# Patient Record
Sex: FEMALE | Race: WHITE | NOT HISPANIC OR LATINO | URBAN - METROPOLITAN AREA
[De-identification: names, ages, dates, MRNs, and addresses within clinical notes are randomized per-mention and may not be internally consistent; named-entity substitution may affect disease eponyms.]

---

## 2017-03-31 ENCOUNTER — APPOINTMENT (OUTPATIENT)
Dept: URBAN - METROPOLITAN AREA CLINIC 200 | Age: 57
Setting detail: DERMATOLOGY
End: 2017-04-12

## 2017-03-31 DIAGNOSIS — S0002XA BLISTER OF FACE, NECK, AND SCALP EXCEPT EYE, WITHOUT MENTION OF INFECTION: ICD-10-CM

## 2017-03-31 DIAGNOSIS — S00521A BLISTER OF FACE, NECK, AND SCALP EXCEPT EYE, WITHOUT MENTION OF INFECTION: ICD-10-CM

## 2017-03-31 DIAGNOSIS — S1092XA BLISTER OF FACE, NECK, AND SCALP EXCEPT EYE, WITHOUT MENTION OF INFECTION: ICD-10-CM

## 2017-03-31 DIAGNOSIS — L20.84 INTRINSIC (ALLERGIC) ECZEMA: ICD-10-CM

## 2017-03-31 DIAGNOSIS — S1012XA BLISTER OF FACE, NECK, AND SCALP EXCEPT EYE, WITHOUT MENTION OF INFECTION: ICD-10-CM

## 2017-03-31 DIAGNOSIS — S0032XA BLISTER OF FACE, NECK, AND SCALP EXCEPT EYE, WITHOUT MENTION OF INFECTION: ICD-10-CM

## 2017-03-31 DIAGNOSIS — S0092XA BLISTER OF FACE, NECK, AND SCALP EXCEPT EYE, WITHOUT MENTION OF INFECTION: ICD-10-CM

## 2017-03-31 DIAGNOSIS — S00522A BLISTER OF FACE, NECK, AND SCALP EXCEPT EYE, WITHOUT MENTION OF INFECTION: ICD-10-CM

## 2017-03-31 DIAGNOSIS — S00429A BLISTER OF FACE, NECK, AND SCALP EXCEPT EYE, WITHOUT MENTION OF INFECTION: ICD-10-CM

## 2017-03-31 PROBLEM — L30.9 DERMATITIS, UNSPECIFIED: Status: ACTIVE | Noted: 2017-03-31

## 2017-03-31 PROBLEM — S60.321A BLISTER (NONTHERMAL) OF RIGHT THUMB, INITIAL ENCOUNTER: Status: ACTIVE | Noted: 2017-03-31

## 2017-03-31 PROBLEM — E03.9 HYPOTHYROIDISM, UNSPECIFIED: Status: ACTIVE | Noted: 2017-03-31

## 2017-03-31 PROCEDURE — 99213 OFFICE O/P EST LOW 20 MIN: CPT

## 2017-03-31 PROCEDURE — OTHER COUNSELING: OTHER

## 2017-03-31 PROCEDURE — OTHER PRESCRIPTION: OTHER

## 2017-03-31 RX ORDER — NYSTATIN AND TRIAMCINOLONE ACETONIDE 100000; 1 [USP'U]/G; MG/G
OINTMENT TOPICAL
Qty: 1 | Refills: 8 | Status: ERX | COMMUNITY
Start: 2017-03-31

## 2017-03-31 ASSESSMENT — LOCATION ZONE DERM
LOCATION ZONE: FINGER
LOCATION ZONE: LEG

## 2017-03-31 ASSESSMENT — LOCATION SIMPLE DESCRIPTION DERM
LOCATION SIMPLE: RIGHT CALF
LOCATION SIMPLE: RIGHT PRETIBIAL REGION
LOCATION SIMPLE: RIGHT THUMB

## 2017-03-31 ASSESSMENT — LOCATION DETAILED DESCRIPTION DERM
LOCATION DETAILED: RIGHT PROXIMAL PRETIBIAL REGION
LOCATION DETAILED: RIGHT PROXIMAL CALF
LOCATION DETAILED: RIGHT PROXIMAL ULNAR THUMB

## 2017-08-23 ENCOUNTER — APPOINTMENT (OUTPATIENT)
Dept: URBAN - METROPOLITAN AREA CLINIC 200 | Age: 57
Setting detail: DERMATOLOGY
End: 2017-08-24

## 2017-08-23 DIAGNOSIS — L20.84 INTRINSIC (ALLERGIC) ECZEMA: ICD-10-CM

## 2017-08-23 PROBLEM — F32.9 MAJOR DEPRESSIVE DISORDER, SINGLE EPISODE, UNSPECIFIED: Status: ACTIVE | Noted: 2017-08-23

## 2017-08-23 PROCEDURE — OTHER COUNSELING: OTHER

## 2017-08-23 PROCEDURE — OTHER PRESCRIPTION: OTHER

## 2017-08-23 PROCEDURE — 99213 OFFICE O/P EST LOW 20 MIN: CPT

## 2017-08-23 RX ORDER — TRIAMCINOLONE ACETONIDE 1 MG/G
CREAM TOPICAL BID
Qty: 1 | Refills: 3 | Status: ERX | COMMUNITY
Start: 2017-08-23

## 2017-08-23 RX ORDER — METHYLPREDNISOLONE 4 MG/1
TABLET ORAL
Qty: 1 | Refills: 0 | Status: ERX | COMMUNITY
Start: 2017-08-23

## 2017-08-23 RX ORDER — EMOLLIENT COMBINATION NO.53
CREAM (GRAM) TOPICAL
Qty: 1 | Refills: 6 | Status: ERX | COMMUNITY
Start: 2017-08-23

## 2017-08-23 ASSESSMENT — LOCATION DETAILED DESCRIPTION DERM
LOCATION DETAILED: LEFT ANTERIOR PROXIMAL THIGH
LOCATION DETAILED: LEFT PROXIMAL DORSAL FOREARM

## 2017-08-23 ASSESSMENT — LOCATION ZONE DERM
LOCATION ZONE: LEG
LOCATION ZONE: ARM

## 2017-08-23 ASSESSMENT — LOCATION SIMPLE DESCRIPTION DERM
LOCATION SIMPLE: LEFT THIGH
LOCATION SIMPLE: LEFT FOREARM

## 2018-10-09 RX ORDER — LOSARTAN POTASSIUM 50 MG/1
TABLET ORAL
Qty: 30 TABLET | Refills: 0 | Status: SHIPPED | OUTPATIENT
Start: 2018-10-09 | End: 2018-11-09 | Stop reason: SDUPTHER

## 2018-10-16 RX ORDER — TRAZODONE HYDROCHLORIDE 150 MG/1
150 TABLET ORAL NIGHTLY
Qty: 90 TABLET | Refills: 0 | Status: SHIPPED | OUTPATIENT
Start: 2018-10-16 | End: 2019-02-27 | Stop reason: SDUPTHER

## 2018-11-01 PROBLEM — E03.9 PRIMARY HYPOTHYROIDISM: Status: ACTIVE | Noted: 2018-11-01

## 2018-11-01 PROBLEM — G47.01 SLEEP DISORDER DUE TO A GENERAL MEDICAL CONDITION, INSOMNIA TYPE: Status: ACTIVE | Noted: 2018-11-01

## 2018-11-01 PROBLEM — J30.2 SEASONAL ALLERGIC RHINITIS: Status: ACTIVE | Noted: 2018-11-01

## 2018-11-01 PROBLEM — J45.909 MILD ASTHMA: Status: ACTIVE | Noted: 2018-11-01

## 2018-11-01 PROBLEM — K63.5 COLON POLYP: Status: ACTIVE | Noted: 2018-11-01

## 2018-11-01 RX ORDER — ALPRAZOLAM 0.25 MG/1
0.25 TABLET ORAL NIGHTLY PRN
COMMUNITY
End: 2018-11-02

## 2018-11-01 RX ORDER — VENLAFAXINE HYDROCHLORIDE 150 MG/1
150 CAPSULE, EXTENDED RELEASE ORAL DAILY
COMMUNITY
End: 2019-02-27 | Stop reason: RX

## 2018-11-02 ENCOUNTER — OFFICE VISIT (OUTPATIENT)
Dept: PRIMARY CARE | Facility: CLINIC | Age: 58
End: 2018-11-02
Payer: COMMERCIAL

## 2018-11-02 VITALS
SYSTOLIC BLOOD PRESSURE: 134 MMHG | HEART RATE: 70 BPM | DIASTOLIC BLOOD PRESSURE: 80 MMHG | HEIGHT: 71 IN | BODY MASS INDEX: 21.98 KG/M2 | WEIGHT: 157 LBS

## 2018-11-02 DIAGNOSIS — Z00.00 PREVENTATIVE HEALTH CARE: ICD-10-CM

## 2018-11-02 DIAGNOSIS — Z23 NEED FOR VACCINATION: Primary | ICD-10-CM

## 2018-11-02 DIAGNOSIS — Z12.39 BREAST CANCER SCREENING: ICD-10-CM

## 2018-11-02 DIAGNOSIS — F41.1 GENERALIZED ANXIETY DISORDER: ICD-10-CM

## 2018-11-02 DIAGNOSIS — J30.1 SEASONAL ALLERGIC RHINITIS DUE TO POLLEN: ICD-10-CM

## 2018-11-02 DIAGNOSIS — I10 ESSENTIAL HYPERTENSION: ICD-10-CM

## 2018-11-02 DIAGNOSIS — R06.83 SNORING: ICD-10-CM

## 2018-11-02 PROBLEM — E03.9 PRIMARY HYPOTHYROIDISM: Status: RESOLVED | Noted: 2018-11-01 | Resolved: 2018-11-02

## 2018-11-02 PROCEDURE — 90471 IMMUNIZATION ADMIN: CPT | Performed by: NURSE PRACTITIONER

## 2018-11-02 PROCEDURE — 99214 OFFICE O/P EST MOD 30 MIN: CPT | Mod: 25 | Performed by: NURSE PRACTITIONER

## 2018-11-02 PROCEDURE — 90686 IIV4 VACC NO PRSV 0.5 ML IM: CPT | Performed by: NURSE PRACTITIONER

## 2018-11-02 RX ORDER — FLUTICASONE PROPIONATE 50 MCG
1 SPRAY, SUSPENSION (ML) NASAL DAILY
Qty: 3 BOTTLE | Refills: 3 | Status: SHIPPED | OUTPATIENT
Start: 2018-11-02 | End: 2019-01-31

## 2018-11-02 RX ORDER — FLUTICASONE PROPIONATE 50 MCG
1 SPRAY, SUSPENSION (ML) NASAL DAILY
COMMUNITY
End: 2018-11-02 | Stop reason: SDUPTHER

## 2018-11-02 RX ORDER — AZELASTINE HCL 205.5 UG/1
2 SPRAY NASAL NIGHTLY
Qty: 90 ML | Refills: 3 | Status: SHIPPED | OUTPATIENT
Start: 2018-11-02 | End: 2019-01-31

## 2018-11-02 ASSESSMENT — ENCOUNTER SYMPTOMS
CONSTITUTIONAL NEGATIVE: 1
CARDIOVASCULAR NEGATIVE: 1
RESPIRATORY NEGATIVE: 1

## 2018-11-02 NOTE — PROGRESS NOTES
[facility]  Sarah Kerr  [address], [address]  [place] [address]  Phone: [phone]  Fax: [phone]      Patient ID: Sherry Tang                              : 1960    Visit Date: 2018    Chief Complaint: Follow-up         Patient ID: Sherry Tang is a 58 y.o. female.    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, insomnia type   • Colon polyp   • Snoring   • Essential hypertension   • Generalized anxiety disorder         Current Outpatient Prescriptions:   •  fluticasone (FLONASE) 50 mcg/actuation nasal spray, Administer 1 spray into each nostril daily., Disp: 3 Bottle, Rfl: 3  •  losartan (COZAAR) 50 mg tablet, TAKE 1 TABLET BY MOUTH DAILY, Disp: 30 tablet, Rfl: 0  •  traZODone (DESYREL) 150 mg tablet, Take 1 tablet (150 mg total) by mouth nightly., Disp: 90 tablet, Rfl: 0  •  venlafaxine XR (EFFEXOR-XR) 150 mg 24 hr capsule, Take 150 mg by mouth daily., Disp: , Rfl:   •  azelastine 0.15 % (205.5 mcg) nasal spray, Administer 2 sprays into each nostril nightly., Disp: 90 mL, Rfl: 3    Allergies   Allergen Reactions   • Latex Rash       Social History     Social History   • Marital status:      Spouse name: N/A   • Number of children: N/A   • Years of education: N/A     Occupational History   • Not on file.     Social History Main Topics   • Smoking status: Never Smoker   • Smokeless tobacco: Never Used   • Alcohol use Not on file   • Drug use: Unknown   • Sexual activity: Not on file     Other Topics Concern   • Not on file     Social History Narrative   • No narrative on file       Health Maintenance   Topic Date Due   • Influenza Vaccine (1) 2018   • Breast Cancer Screening  2019   • Pap Smear  2019   • DTaP, Tdap, and Td Vaccines (2 - Td) 2022   • Colonoscopy  2025   • HPV Vaccines  Aged Out   •  "Meningococcal Vaccine  Aged Out   • HIB Vaccines  Aged Out   • IPV Vaccines  Aged Out   • Hepatitis C Screening  Completed       HPI  Med refills for allergies renewal. Doing well. Denies any acute sinus symptoms--pressure, pain, purulent mucus, earache, cough.    Needs mammogram Rx. Breast exams she dose monthly. Denies any changes, lumps, redness, nipple discharge, pain.    Snoring--wants an evaluation for sleep apnea. Very loud snoring per her significant others. Sister with sleep apnea. Not sure if her snoring is related but she would like to be evaluated. Denies chronic AM fatigue. Has not noted any pauses in her own breathing or sudden abrupt awakenings in the night.    BP check. On Losartan. Denies any CP, SOB, headache, vision change. No compliance issues.         The following have been reviewed and updated as appropriate in this visit:  Allergies  Meds  Problems         Review of System  Review of Systems   Constitutional: Negative.    Respiratory: Negative.    Cardiovascular: Negative.        Objective     Vitals  Vitals:    11/02/18 0825   BP: 134/80   Pulse: 70   Weight: 71.2 kg (157 lb)   Height: 1.803 m (5' 11\")     Body mass index is 21.9 kg/m².    Physical Exam  Physical Exam   Constitutional: She is oriented to person, place, and time. She appears well-developed and well-nourished. No distress.   HENT:   Right Ear: Tympanic membrane, external ear and ear canal normal.   Left Ear: Tympanic membrane, external ear and ear canal normal.   Nose: Nose normal.   Mouth/Throat: Oropharynx is clear and moist and mucous membranes are normal.   Neck: Neck supple. No JVD present. No thyromegaly present.   Cardiovascular: Normal rate, regular rhythm and normal heart sounds.    No murmur heard.  Pulmonary/Chest: Effort normal and breath sounds normal. No respiratory distress. She has no wheezes.   Musculoskeletal: She exhibits no edema.   Lymphadenopathy:     She has no cervical adenopathy.   Neurological: She " is alert and oriented to person, place, and time.   Skin: She is not diaphoretic.   Psychiatric: She has a normal mood and affect. Her behavior is normal. Judgment and thought content normal. She expresses no suicidal ideation. She expresses no suicidal plans.   Vitals reviewed.      Assessment/Plan     Problem List Items Addressed This Visit     Seasonal allergic rhinitis     Continue allergy nasal sprays daily.         Relevant Medications    fluticasone (FLONASE) 50 mcg/actuation nasal spray    azelastine 0.15 % (205.5 mcg) nasal spray    Snoring     Sent to [facility] for sleep evaluation.         Relevant Orders    Ambulatory referral to Sleep Medicine    Essential hypertension     Stable on Losartan.  Routine labs ordered.  Routine follow up 6 mo.         Generalized anxiety disorder     Continue Venlafaxine--stable           Other Visit Diagnoses     Need for vaccination    -  Primary    Relevant Orders    Influenza vaccine quadrivalent preservative free 6 mon and older IM (FluLaval) (Completed)    Breast cancer screening        Relevant Orders    BI SCREENING MAMMOGRAM BILATERAL    Preventative health care        Relevant Orders    CBC and Differential    Comprehensive metabolic panel    Lipid panel    TSH 3rd Generation    Urinalysis with Reflex Culture              SHAAN Perez  11/2/2018

## 2018-11-09 RX ORDER — LOSARTAN POTASSIUM 50 MG/1
TABLET ORAL
Qty: 30 TABLET | Refills: 0 | Status: SHIPPED | OUTPATIENT
Start: 2018-11-09 | End: 2018-12-14 | Stop reason: SDUPTHER

## 2018-12-14 RX ORDER — LOSARTAN POTASSIUM 50 MG/1
TABLET ORAL
Qty: 30 TABLET | Refills: 0 | Status: SHIPPED | OUTPATIENT
Start: 2018-12-14 | End: 2019-08-06

## 2019-02-27 RX ORDER — TRAZODONE HYDROCHLORIDE 150 MG/1
150 TABLET ORAL NIGHTLY
Qty: 90 TABLET | Refills: 2 | Status: SHIPPED | OUTPATIENT
Start: 2019-02-27 | End: 2019-08-06 | Stop reason: SDUPTHER

## 2019-02-27 RX ORDER — VENLAFAXINE HYDROCHLORIDE 150 MG/1
150 CAPSULE, EXTENDED RELEASE ORAL DAILY
Qty: 90 CAPSULE | Refills: 2 | Status: SHIPPED | OUTPATIENT
Start: 2019-02-27 | End: 2019-08-06 | Stop reason: SDUPTHER

## 2019-02-27 NOTE — TELEPHONE ENCOUNTER
Patient called from [place] requesting refills to be sent to local [facility] and then will have that prescription transferred.  I called patient back at  and verified strength, dosing and quantity.  Qued up for refill.

## 2019-03-12 ENCOUNTER — TELEPHONE (OUTPATIENT)
Dept: PRIMARY CARE | Facility: CLINIC | Age: 59
End: 2019-03-12

## 2019-03-12 NOTE — TELEPHONE ENCOUNTER
Patient staying for extended period of time in [place].  Would like last set of labs.  No way to deliver labs as there is no fax and she does not have a portal.  Advised the best way to get access to labs is to create a lab abel portal which will allow her full access to all her labs. Patient agrees.

## 2019-03-21 PROBLEM — F41.1 GENERALIZED ANXIETY DISORDER: Status: ACTIVE | Noted: 2019-03-21

## 2019-03-21 PROBLEM — M25.50 JOINT PAIN: Status: ACTIVE | Noted: 2019-03-21

## 2019-03-21 PROBLEM — E03.9 PRIMARY HYPOTHYROIDISM: Status: ACTIVE | Noted: 2019-03-21

## 2019-03-21 PROBLEM — J45.909 MILD ASTHMA: Status: ACTIVE | Noted: 2019-03-21

## 2019-03-21 PROBLEM — J30.9 ATOPIC RHINITIS: Status: ACTIVE | Noted: 2019-03-21

## 2019-03-21 PROBLEM — K63.5 COLON POLYP: Status: ACTIVE | Noted: 2019-03-21

## 2019-03-21 PROBLEM — G47.01 SLEEP DISORDER DUE TO A GENERAL MEDICAL CONDITION, INSOMNIA TYPE: Status: ACTIVE | Noted: 2019-03-21

## 2019-05-01 DIAGNOSIS — Z12.39 BREAST CANCER SCREENING: ICD-10-CM

## 2019-07-16 NOTE — TELEPHONE ENCOUNTER
Medicine Refill Request    Last Office Visit: 11/2/2018  Next Office Visit: Visit date not found        Current Outpatient Prescriptions:   •  losartan (COZAAR) 50 mg tablet, TAKE 1 TABLET BY MOUTH DAILY, Disp: 30 tablet, Rfl: 0  •  traZODone (DESYREL) 150 mg tablet, Take 1 tablet (150 mg total) by mouth nightly., Disp: 90 tablet, Rfl: 2  •  venlafaxine XR (EFFEXOR-XR) 150 mg 24 hr capsule, Take 1 capsule (150 mg total) by mouth daily., Disp: 90 capsule, Rfl: 2      BP Readings from Last 3 Encounters:   11/02/18 134/80       Recent Lab results:  No results found for: CHOL, No results found for: HDL, No results found for: LDLCALC, No results found for: TRIG     No results found for: GLUCOSE, No results found for: HGBA1C      No results found for: CREATININE    No results found for: TSH

## 2019-07-16 NOTE — TELEPHONE ENCOUNTER
Last OV was 11/2018-needs appt and to get TSH done (labs were ordered at last visit-not done yet)-thank you

## 2019-07-18 ENCOUNTER — APPOINTMENT (OUTPATIENT)
Dept: URBAN - METROPOLITAN AREA CLINIC 200 | Age: 59
Setting detail: DERMATOLOGY
End: 2019-07-24

## 2019-07-18 DIAGNOSIS — L21.8 OTHER SEBORRHEIC DERMATITIS: ICD-10-CM

## 2019-07-18 DIAGNOSIS — L57.8 OTHER SKIN CHANGES DUE TO CHRONIC EXPOSURE TO NONIONIZING RADIATION: ICD-10-CM

## 2019-07-18 DIAGNOSIS — L20.84 INTRINSIC (ALLERGIC) ECZEMA: ICD-10-CM

## 2019-07-18 PROBLEM — F32.9 MAJOR DEPRESSIVE DISORDER, SINGLE EPISODE, UNSPECIFIED: Status: ACTIVE | Noted: 2019-07-18

## 2019-07-18 PROCEDURE — 99212 OFFICE O/P EST SF 10 MIN: CPT

## 2019-07-18 PROCEDURE — OTHER MIPS QUALITY: OTHER

## 2019-07-18 PROCEDURE — OTHER PRESCRIPTION: OTHER

## 2019-07-18 PROCEDURE — OTHER COUNSELING: OTHER

## 2019-07-18 RX ORDER — BETAMETHASONE DIPROPIONATE 0.5 MG/ML
LOTION, AUGMENTED TOPICAL
Qty: 1 | Refills: 3 | Status: ERX | COMMUNITY
Start: 2019-07-18

## 2019-07-18 RX ORDER — BETAMETHASONE DIPROPIONATE 0.5 MG/G
CREAM TOPICAL BID
Qty: 1 | Refills: 5 | Status: ERX | COMMUNITY
Start: 2019-07-18

## 2019-07-18 ASSESSMENT — SEVERITY ASSESSMENT: SEVERITY: MILD TO MODERATE

## 2019-07-18 ASSESSMENT — LOCATION DETAILED DESCRIPTION DERM
LOCATION DETAILED: LEFT MEDIAL SUPERIOR CHEST
LOCATION DETAILED: HAIR
LOCATION DETAILED: LEFT ANTERIOR DISTAL THIGH

## 2019-07-18 ASSESSMENT — LOCATION ZONE DERM
LOCATION ZONE: TRUNK
LOCATION ZONE: SCALP
LOCATION ZONE: LEG

## 2019-07-18 ASSESSMENT — LOCATION SIMPLE DESCRIPTION DERM
LOCATION SIMPLE: HAIR
LOCATION SIMPLE: LEFT THIGH
LOCATION SIMPLE: CHEST

## 2019-08-05 ENCOUNTER — TELEPHONE (OUTPATIENT)
Dept: PRIMARY CARE | Facility: CLINIC | Age: 59
End: 2019-08-05

## 2019-08-05 DIAGNOSIS — Z00.00 PREVENTATIVE HEALTH CARE: Primary | ICD-10-CM

## 2019-08-06 ENCOUNTER — OFFICE VISIT (OUTPATIENT)
Dept: PRIMARY CARE | Facility: CLINIC | Age: 59
End: 2019-08-06

## 2019-08-06 VITALS
DIASTOLIC BLOOD PRESSURE: 80 MMHG | HEIGHT: 71 IN | BODY MASS INDEX: 20.58 KG/M2 | WEIGHT: 147 LBS | HEART RATE: 70 BPM | SYSTOLIC BLOOD PRESSURE: 122 MMHG

## 2019-08-06 DIAGNOSIS — N94.10 DYSPAREUNIA IN FEMALE: ICD-10-CM

## 2019-08-06 DIAGNOSIS — R59.9 LYMPH NODES ENLARGED: ICD-10-CM

## 2019-08-06 DIAGNOSIS — E03.9 PRIMARY HYPOTHYROIDISM: ICD-10-CM

## 2019-08-06 DIAGNOSIS — F41.1 GENERALIZED ANXIETY DISORDER: Primary | ICD-10-CM

## 2019-08-06 DIAGNOSIS — G47.01 SLEEP DISORDER DUE TO A GENERAL MEDICAL CONDITION, INSOMNIA TYPE: ICD-10-CM

## 2019-08-06 PROCEDURE — 99213 OFFICE O/P EST LOW 20 MIN: CPT | Performed by: INTERNAL MEDICINE

## 2019-08-06 RX ORDER — TRAZODONE HYDROCHLORIDE 150 MG/1
150 TABLET ORAL NIGHTLY
Qty: 90 TABLET | Refills: 1 | Status: SHIPPED | OUTPATIENT
Start: 2019-08-06 | End: 2019-08-08 | Stop reason: SDUPTHER

## 2019-08-06 RX ORDER — LEVOTHYROXINE SODIUM 150 UG/1
150 TABLET ORAL
Qty: 90 TABLET | Refills: 3 | Status: SHIPPED | OUTPATIENT
Start: 2019-08-06 | End: 2019-08-08 | Stop reason: SDUPTHER

## 2019-08-06 RX ORDER — VENLAFAXINE HYDROCHLORIDE 150 MG/1
150 CAPSULE, EXTENDED RELEASE ORAL DAILY
Qty: 90 CAPSULE | Refills: 1 | Status: SHIPPED | OUTPATIENT
Start: 2019-08-06 | End: 2019-08-08 | Stop reason: SDUPTHER

## 2019-08-06 RX ORDER — LEVOTHYROXINE SODIUM 150 UG/1
TABLET ORAL
COMMUNITY
End: 2019-08-06 | Stop reason: SDUPTHER

## 2019-08-06 RX ORDER — AZITHROMYCIN 250 MG/1
TABLET, FILM COATED ORAL
Qty: 6 TABLET | Refills: 0 | Status: SHIPPED | OUTPATIENT
Start: 2019-08-06 | End: 2019-08-11

## 2019-08-06 ASSESSMENT — ENCOUNTER SYMPTOMS
TROUBLE SWALLOWING: 0
FATIGUE: 0
FEVER: 0
PALPITATIONS: 0
SORE THROAT: 0
WHEEZING: 0
SHORTNESS OF BREATH: 0
COUGH: 0
ACTIVITY CHANGE: 0

## 2019-08-06 NOTE — PROGRESS NOTES
[facility]  Dr. Chanelle Grier  [address], [address]  [place] [address]  Phone: [phone]  Fax: [phone]      Patient ID: Sherry Tang                              : 1960    Visit Date: 2019    Chief Complaint: Dyspareunia (since about April had intercourse twice both times painful. Use coconut oil as a [place]) and Hypothyroidism (needs meds refilled )      HPI  Going for labs later today.  Lives in [facility] as she could not find employment in the . She was back for her 40th HS reunion in [place], [place] and admits she ate and drank too much. Still will do the labs she should have had prior to the visit. Leaving to return to  .  Here for medication refills.  Tried with a doctor in the [place] to taper the anxiety and sleep meds but could not.  No longer needs the BP medication.  She also noticed an enlarged LN at the right chin that is smaller but still apparent. She denies sinusitis but has slight discomfort at the right neck and throat. No fever.    Pt c/o painful intercourse.         Subjective      Patient ID: Sherry Tang is a 58 y.o. female.    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, insomnia type   • Joint pain   • Colon polyp   • Lymph nodes enlarged         Current Outpatient Prescriptions:   •  traZODone (DESYREL) 150 mg tablet, Take 1 tablet (150 mg total) by mouth nightly., Disp: 90 tablet, Rfl: 1  •  venlafaxine XR (EFFEXOR-XR) 150 mg 24 hr capsule, Take 1 capsule (150 mg total) by mouth daily., Disp: 90 capsule, Rfl: 1  •  azithromycin (ZITHROMAX) 250 mg  "tablet, Take 2 tablets the first day, then 1 tablet daily for 4 days., Disp: 6 tablet, Rfl: 0  •  levothyroxine (SYNTHROID) 150 mcg tablet, Take 1 tablet (150 mcg total) by mouth daily., Disp: 90 tablet, Rfl: 3    Allergies   Allergen Reactions   • Latex Rash       Social History     Social History   • Marital status: Single     Spouse name: N/A   • Number of children: N/A   • Years of education: N/A     Occupational History   • Not on file.     Social History Main Topics   • Smoking status: Never Smoker   • Smokeless tobacco: Never Used   • Alcohol use Not on file   • Drug use: Unknown   • Sexual activity: Not on file     Other Topics Concern   • Not on file     Social History Narrative    ** Merged History Encounter **            Health Maintenance   Topic Date Due   • HIV Screening  09/19/1973   • Cervical Cancer Screening  09/19/1981   • Zoster Vaccine (1 of 2) 09/19/2010   • Influenza Vaccine (1) 08/01/2019   • Mammogram  11/16/2020   • Colonoscopy  11/12/2025   • DTaP, Tdap, and Td Vaccines (3 - Td) 05/01/2027   • Meningococcal ACWY  Aged Out   • Varicella Vaccines  Aged Out   • HIB Vaccines  Aged Out   • IPV Vaccines  Aged Out   • HPV Vaccines  Aged Out   • Hepatitis C Screening  Completed   • Pneumococcal  Aged Out       No past medical history on file.    The following have been reviewed and updated as appropriate in this visit:  Allergies  Meds  Problems         Review of System  Review of Systems   Constitutional: Negative for activity change, fatigue and fever.   HENT: Negative for congestion, ear pain, sore throat and trouble swallowing.    Respiratory: Negative for cough, shortness of breath and wheezing.    Cardiovascular: Negative for chest pain and palpitations.       Objective     Vitals  Vitals:    08/06/19 1018   BP: 122/80   BP Location: Right upper arm   Patient Position: Sitting   Pulse: 70   Weight: 66.7 kg (147 lb)   Height: 1.803 m (5' 11\")       Body mass index is 20.5 " kg/m².    Physical Exam  Physical Exam   Constitutional: She is oriented to person, place, and time. She appears well-nourished.   HENT:   Head: Normocephalic.   Nose: Nose normal.   Mouth/Throat: Oropharynx is clear and moist.   Eyes: Pupils are equal, round, and reactive to light. Conjunctivae and EOM are normal.   Neck: Normal range of motion. Neck supple. No thyromegaly present.   Cardiovascular: Normal rate, regular rhythm and normal heart sounds.  Exam reveals no gallop.    No murmur heard.  Pulmonary/Chest: Effort normal and breath sounds normal.   Lymphadenopathy:     She has cervical adenopathy (1.5 cm NT LN at right submandibular region).   Neurological: She is alert and oriented to person, place, and time.   Skin: Skin is warm and dry.   Psychiatric: She has a normal mood and affect. Thought content normal.   Vitals reviewed.      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 Aware she may need to f/u with a physician in [place] if it persists.         Relevant Medications    azithromycin (ZITHROMAX) 250 mg tablet      Other Visit Diagnoses     Dyspareunia in female        Advised the pt no time was allotted at this visit to address this issue. Referred to gyn or pt suggested [facility] given her time constraints.       Offered to see if pt could be seen today with [facility] gynecologist but pt declined per Magali at check out.  Made her aware she may need to address her new concerns back in      There are no Patient Instructions on file for this visit.    Return in about 6 months (around 2/6/2020), or if symptoms worsen or fail to improve.      Chanelle Grier MD  8/6/2019

## 2019-08-06 NOTE — PROGRESS NOTES
[facility]  Dr. Chanelle Grier  [address], [address]  [place] [address]  Phone: [phone]  Fax: [phone]      Patient ID: Sherry Tang                              : 1960    Visit Date: 2019    Chief Complaint: Dyspareunia (since about April had intercourse twice both times painful. Use coconut oil as a [place]) and Hypothyroidism (needs meds refilled )      HPI  Has international health care coverage.  [place]ives in the [place]  Going for residency.  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           Subjective      Patient ID: Sherry Tang is a 58 y.o. female.    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, insomnia type   • Joint pain   • Colon polyp   • Lymph nodes enlarged         Current Outpatient Prescriptions:   •  traZODone (DESYREL) 150 mg tablet, Take 1 tablet (150 mg total) by mouth nightly., Disp: 90 tablet, Rfl: 1  •  venlafaxine XR (EFFEXOR-XR) 150 mg 24 hr capsule, Take 1 capsule (150 mg total) by mouth daily., Disp: 90 capsule, Rfl: 1  •  azithromycin (ZITHROMAX) 250 mg tablet, Take 2 tablets the first day, then 1 tablet daily for 4 days., Disp: 6 tablet, Rfl: 0  •  levothyroxine (SYNTHROID) 150 mcg tablet, Take 1 tablet (150 mcg total) by mouth daily., Disp: 90 tablet, Rfl: 3    Allergies   Allergen Reactions   • Latex Rash       Social History     Social History   • Marital status: Single     Spouse name: N/A   • Number of children: N/A   • Years of education: N/A     Occupational History   • Not on file.     Social History Main Topics   • Smoking status: Never Smoker   •  "Smokeless tobacco: Never Used   • Alcohol use Not on file   • Drug use: Unknown   • Sexual activity: Not on file     Other Topics Concern   • Not on file     Social History Narrative    ** Merged History Encounter **            Health Maintenance   Topic Date Due   • HIV Screening  09/19/1973   • Cervical Cancer Screening  09/19/1981   • Zoster Vaccine (1 of 2) 09/19/2010   • Influenza Vaccine (1) 08/01/2019   • Mammogram  11/16/2020   • Colonoscopy  11/12/2025   • DTaP, Tdap, and Td Vaccines (3 - Td) 05/01/2027   • Meningococcal ACWY  Aged Out   • Varicella Vaccines  Aged Out   • HIB Vaccines  Aged Out   • IPV Vaccines  Aged Out   • HPV Vaccines  Aged Out   • Hepatitis C Screening  Completed   • Pneumococcal  Aged Out       No past medical history on file.    The following have been reviewed and updated as appropriate in this visit:         Review of System  Review of Systems    Objective     Vitals  Vitals:    08/06/19 1018   BP: 122/80   BP Location: Right upper arm   Patient Position: Sitting   Pulse: 70   Weight: 66.7 kg (147 lb)   Height: 1.803 m (5' 11\")       Body mass index is 20.5 kg/m².    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    No Follow-up on file.      Chanelle Grier MD  8/6/2019      "

## 2019-08-06 NOTE — ASSESSMENT & PLAN NOTE
If the LN remains in the next week or so, she will use the Z-elayne, which she has taken in the past. Aware she may need to f/u with a physician in [place] if it persists.

## 2019-08-07 LAB
ALBUMIN SERPL-MCNC: 4.5 G/DL (ref 3.5–5.5)
ALBUMIN/GLOB SERPL: 1.7 {RATIO} (ref 1.2–2.2)
ALP SERPL-CCNC: 64 IU/L (ref 39–117)
ALT SERPL-CCNC: 14 IU/L (ref 0–32)
APPEARANCE UR: CLEAR
AST SERPL-CCNC: 21 IU/L (ref 0–40)
BACTERIA #/AREA URNS HPF: NORMAL /[HPF]
BASOPHILS # BLD AUTO: 0 X10E3/UL (ref 0–0.2)
BASOPHILS NFR BLD AUTO: 0 %
BILIRUB SERPL-MCNC: 0.5 MG/DL (ref 0–1.2)
BILIRUB UR QL STRIP: NEGATIVE
BUN SERPL-MCNC: 12 MG/DL (ref 6–24)
BUN/CREAT SERPL: 13 (ref 9–23)
CALCIUM SERPL-MCNC: 9.4 MG/DL (ref 8.7–10.2)
CHLORIDE SERPL-SCNC: 100 MMOL/L (ref 96–106)
CHOLEST SERPL-MCNC: 218 MG/DL (ref 100–199)
CO2 SERPL-SCNC: 25 MMOL/L (ref 20–29)
COLOR UR: YELLOW
CREAT SERPL-MCNC: 0.93 MG/DL (ref 0.57–1)
EOSINOPHIL # BLD AUTO: 0.2 X10E3/UL (ref 0–0.4)
EOSINOPHIL NFR BLD AUTO: 3 %
EPI CELLS #/AREA URNS HPF: NORMAL /HPF
ERYTHROCYTE [DISTWIDTH] IN BLOOD BY AUTOMATED COUNT: 12.5 % (ref 12.3–15.4)
GLOBULIN SER CALC-MCNC: 2.6 G/DL (ref 1.5–4.5)
GLUCOSE SERPL-MCNC: 91 MG/DL (ref 65–99)
GLUCOSE UR QL: NEGATIVE
HCT VFR BLD AUTO: 39.2 % (ref 34–46.6)
HDLC SERPL-MCNC: 89 MG/DL
HGB BLD-MCNC: 13.6 G/DL (ref 11.1–15.9)
HGB UR QL STRIP: NEGATIVE
IMM GRANULOCYTES # BLD AUTO: 0 X10E3/UL (ref 0–0.1)
IMM GRANULOCYTES NFR BLD AUTO: 0 %
KETONES UR QL STRIP: NEGATIVE
LAB CORP EGFR IF AFRICN AM: 78 ML/MIN/1.73
LAB CORP EGFR IF NONAFRICN AM: 68 ML/MIN/1.73
LAB CORP URINALYSIS REFLEX: (no result)
LDLC SERPL CALC-MCNC: 108 MG/DL (ref 0–99)
LEUKOCYTE ESTERASE UR QL STRIP: NEGATIVE
LYMPHOCYTES # BLD AUTO: 2.1 X10E3/UL (ref 0.7–3.1)
LYMPHOCYTES NFR BLD AUTO: 31 %
MCH RBC QN AUTO: 29.9 PG (ref 26.6–33)
MCHC RBC AUTO-ENTMCNC: 34.7 G/DL (ref 31.5–35.7)
MCV RBC AUTO: 86 FL (ref 79–97)
MICRO URNS: (no result)
MICRO URNS: (no result)
MONOCYTES # BLD AUTO: 0.6 X10E3/UL (ref 0.1–0.9)
MONOCYTES NFR BLD AUTO: 8 %
NEUTROPHILS # BLD AUTO: 3.9 X10E3/UL (ref 1.4–7)
NEUTROPHILS NFR BLD AUTO: 58 %
NITRITE UR QL STRIP: NEGATIVE
PH UR STRIP: 5.5 [PH] (ref 5–7.5)
PLATELET # BLD AUTO: 240 X10E3/UL (ref 150–450)
POTASSIUM SERPL-SCNC: 4.9 MMOL/L (ref 3.5–5.2)
PROT SERPL-MCNC: 7.1 G/DL (ref 6–8.5)
PROT UR QL STRIP: NEGATIVE
RBC # BLD AUTO: 4.55 X10E6/UL (ref 3.77–5.28)
RBC #/AREA URNS HPF: NORMAL /HPF
SODIUM SERPL-SCNC: 139 MMOL/L (ref 134–144)
SP GR UR: 1.01 (ref 1–1.03)
TRIGL SERPL-MCNC: 107 MG/DL (ref 0–149)
TSH SERPL DL<=0.005 MIU/L-ACNC: 0.34 UIU/ML (ref 0.45–4.5)
UROBILINOGEN UR STRIP-MCNC: 0.2 MG/DL (ref 0.2–1)
VLDLC SERPL CALC-MCNC: 21 MG/DL (ref 5–40)
WBC # BLD AUTO: 6.8 X10E3/UL (ref 3.4–10.8)
WBC #/AREA URNS HPF: NORMAL /HPF

## 2019-08-08 ENCOUNTER — TELEPHONE (OUTPATIENT)
Dept: PRIMARY CARE | Facility: CLINIC | Age: 59
End: 2019-08-08

## 2019-08-08 DIAGNOSIS — F41.1 GENERALIZED ANXIETY DISORDER: ICD-10-CM

## 2019-08-08 DIAGNOSIS — G47.01 SLEEP DISORDER DUE TO A GENERAL MEDICAL CONDITION, INSOMNIA TYPE: ICD-10-CM

## 2019-08-08 DIAGNOSIS — E03.9 PRIMARY HYPOTHYROIDISM: ICD-10-CM

## 2019-08-08 RX ORDER — LEVOTHYROXINE SODIUM 150 UG/1
150 TABLET ORAL
Qty: 90 TABLET | Refills: 3 | Status: SHIPPED | OUTPATIENT
Start: 2019-08-08 | End: 2021-10-28 | Stop reason: SDUPTHER

## 2019-08-08 RX ORDER — VENLAFAXINE HYDROCHLORIDE 150 MG/1
150 CAPSULE, EXTENDED RELEASE ORAL DAILY
Qty: 90 CAPSULE | Refills: 1 | Status: SHIPPED | OUTPATIENT
Start: 2019-08-08 | End: 2021-10-28 | Stop reason: SDUPTHER

## 2019-08-08 RX ORDER — TRAZODONE HYDROCHLORIDE 150 MG/1
150 TABLET ORAL NIGHTLY
Qty: 90 TABLET | Refills: 1 | Status: SHIPPED | OUTPATIENT
Start: 2019-08-08 | End: 2021-10-28 | Stop reason: SDUPTHER

## 2019-08-08 NOTE — TELEPHONE ENCOUNTER
She is asking for a 30 day supply of azithromycin. I see you have her a normal zpak but did you want this changed. Also she would like theses all printed

## 2019-08-08 NOTE — TELEPHONE ENCOUNTER
Patient is requesting physical scripts for all four meds so she can take it to the pharma. The pharma only gave her one month supply and told her to bring in the scripts. I can give her a call when it is ready to be picked up.     Thanks!                              Medicine Refill Request    Last Office Visit: 8/6/2019  Next Office Visit: Visit date not found        Current Outpatient Prescriptions:   •  azithromycin (ZITHROMAX) 250 mg tablet, Take 2 tablets the first day, then 1 tablet daily for 4 days., Disp: 6 tablet, Rfl: 0  •  levothyroxine (SYNTHROID) 150 mcg tablet, Take 1 tablet (150 mcg total) by mouth daily., Disp: 90 tablet, Rfl: 3  •  traZODone (DESYREL) 150 mg tablet, Take 1 tablet (150 mg total) by mouth nightly., Disp: 90 tablet, Rfl: 1  •  venlafaxine XR (EFFEXOR-XR) 150 mg 24 hr capsule, Take 1 capsule (150 mg total) by mouth daily., Disp: 90 capsule, Rfl: 1      BP Readings from Last 3 Encounters:   08/06/19 122/80   11/02/18 134/80       Recent Lab results:  Lab Results   Component Value Date    CHOL 218 (H) 08/06/2019   ,   Lab Results   Component Value Date    HDL 89 08/06/2019   ,   Lab Results   Component Value Date    LDLCALC 108 (H) 08/06/2019   ,   Lab Results   Component Value Date    TRIG 107 08/06/2019        Lab Results   Component Value Date    GLUCOSE 91 08/06/2019   , No results found for: HGBA1C      Lab Results   Component Value Date    CREATININE 0.93 08/06/2019       Lab Results   Component Value Date    TSH 0.338 (L) 08/06/2019

## 2019-08-08 NOTE — TELEPHONE ENCOUNTER
The pt's labs are normal except it appears the pt could be on a little too much levothyroxine. I recommend she stay on her current dose, but pick one day of the week, say Monday, and take just 1/2 tab. Thx

## 2019-08-08 NOTE — TELEPHONE ENCOUNTER
Staff, I do not know this pt's detailed history. Why does she want #30 days of azithromycin. If the reason is not clear, please leave this renewal for Sarah. Rosmery

## 2019-08-09 ENCOUNTER — TELEPHONE (OUTPATIENT)
Dept: PRIMARY CARE | Facility: CLINIC | Age: 59
End: 2019-08-09

## 2019-08-09 RX ORDER — LEVOTHYROXINE SODIUM 150 UG/1
TABLET ORAL
Qty: 30 TABLET | Refills: 0 | OUTPATIENT
Start: 2019-08-09

## 2020-04-22 ENCOUNTER — TELEPHONE (OUTPATIENT)
Dept: FAMILY MEDICINE | Facility: CLINIC | Age: 60
End: 2020-04-22

## 2020-04-22 NOTE — TELEPHONE ENCOUNTER
Called pt to set up a telemed appt.  Pt states she is actually stuck in the [place] and has been there for a while.  She will contact us once [place] the [place] and gets insurance again.

## 2020-11-05 ENCOUNTER — TELEPHONE (OUTPATIENT)
Dept: PRIMARY CARE | Facility: CLINIC | Age: 60
End: 2020-11-05

## 2020-11-05 NOTE — TELEPHONE ENCOUNTER
Pt's needs to have an appointment for medication refills. Please call pt to verify if she has new insurance and is able to come in for a visit.

## 2021-04-09 ENCOUNTER — TELEPHONE (OUTPATIENT)
Dept: PRIMARY CARE | Facility: CLINIC | Age: 61
End: 2021-04-09

## 2021-04-09 NOTE — TELEPHONE ENCOUNTER
She is in [facility] [place] and does not know when she is returning. Suggested she see someone there since she has not been seen here since 8/2019 and we can not do a telemed. She states depression meds are not available there. She would have a provider here have them send to pharmacy here and a friend of hers will ship them to her. Please advise

## 2021-04-09 NOTE — TELEPHONE ENCOUNTER
Medicine Refill Request    Last Office Visit: 8/6/2019  Last Telemedicine Visit: Visit date not found    Next Office Visit: Visit date not found  Next Telemedicine Visit: Visit date not found     Patient needs an appointment. Received a fax for refills on Venlafaxine and Trazodone.     Current Outpatient Medications:   •  levothyroxine (SYNTHROID) 150 mcg tablet, Take 1 tablet (150 mcg total) by mouth daily., Disp: 90 tablet, Rfl: 3  •  traZODone (DESYREL) 150 mg tablet, Take 1 tablet (150 mg total) by mouth nightly., Disp: 90 tablet, Rfl: 1  •  venlafaxine XR (EFFEXOR-XR) 150 mg 24 hr capsule, Take 1 capsule (150 mg total) by mouth daily., Disp: 90 capsule, Rfl: 1      BP Readings from Last 3 Encounters:   08/06/19 122/80   11/02/18 134/80       Recent Lab results:  Lab Results   Component Value Date    CHOL 218 (H) 08/06/2019   ,   Lab Results   Component Value Date    HDL 89 08/06/2019   ,   Lab Results   Component Value Date    LDLCALC 108 (H) 08/06/2019   ,   Lab Results   Component Value Date    TRIG 107 08/06/2019        Lab Results   Component Value Date    GLUCOSE 91 08/06/2019   , No results found for: HGBA1C      Lab Results   Component Value Date    CREATININE 0.93 08/06/2019       Lab Results   Component Value Date    TSH 0.338 (L) 08/06/2019

## 2021-06-11 ENCOUNTER — APPOINTMENT (OUTPATIENT)
Dept: URBAN - METROPOLITAN AREA CLINIC 200 | Age: 61
Setting detail: DERMATOLOGY
End: 2021-06-11

## 2021-06-11 DIAGNOSIS — L57.8 OTHER SKIN CHANGES DUE TO CHRONIC EXPOSURE TO NONIONIZING RADIATION: ICD-10-CM

## 2021-07-23 ENCOUNTER — TELEPHONE (OUTPATIENT)
Dept: PRIMARY CARE | Facility: CLINIC | Age: 61
End: 2021-07-23

## 2021-10-28 ENCOUNTER — OFFICE VISIT (OUTPATIENT)
Dept: PRIMARY CARE | Facility: CLINIC | Age: 61
End: 2021-10-28
Payer: COMMERCIAL

## 2021-10-28 VITALS
DIASTOLIC BLOOD PRESSURE: 80 MMHG | WEIGHT: 153 LBS | OXYGEN SATURATION: 98 % | SYSTOLIC BLOOD PRESSURE: 120 MMHG | BODY MASS INDEX: 21.34 KG/M2 | HEART RATE: 67 BPM

## 2021-10-28 DIAGNOSIS — F41.1 GENERALIZED ANXIETY DISORDER: ICD-10-CM

## 2021-10-28 DIAGNOSIS — G47.01 SLEEP DISORDER DUE TO A GENERAL MEDICAL CONDITION, INSOMNIA TYPE: ICD-10-CM

## 2021-10-28 DIAGNOSIS — E03.9 PRIMARY HYPOTHYROIDISM: ICD-10-CM

## 2021-10-28 DIAGNOSIS — Z23 NEED FOR VACCINATION: ICD-10-CM

## 2021-10-28 DIAGNOSIS — Z12.31 BREAST CANCER SCREENING BY MAMMOGRAM: ICD-10-CM

## 2021-10-28 DIAGNOSIS — Z00.00 ROUTINE PHYSICAL EXAMINATION: Primary | ICD-10-CM

## 2021-10-28 DIAGNOSIS — J45.30 MILD PERSISTENT ASTHMA WITHOUT COMPLICATION: ICD-10-CM

## 2021-10-28 DIAGNOSIS — J45.20 MILD INTERMITTENT ASTHMA WITHOUT COMPLICATION: ICD-10-CM

## 2021-10-28 DIAGNOSIS — B35.1 ONYCHOMYCOSIS: ICD-10-CM

## 2021-10-28 PROBLEM — R06.83 SNORING: Status: RESOLVED | Noted: 2018-11-02 | Resolved: 2021-10-28

## 2021-10-28 PROBLEM — M25.50 JOINT PAIN: Status: RESOLVED | Noted: 2019-03-21 | Resolved: 2021-10-28

## 2021-10-28 PROBLEM — R59.9 LYMPH NODES ENLARGED: Status: RESOLVED | Noted: 2019-08-06 | Resolved: 2021-10-28

## 2021-10-28 PROCEDURE — 3008F BODY MASS INDEX DOCD: CPT | Performed by: NURSE PRACTITIONER

## 2021-10-28 PROCEDURE — 99213 OFFICE O/P EST LOW 20 MIN: CPT | Mod: 25 | Performed by: NURSE PRACTITIONER

## 2021-10-28 PROCEDURE — 90471 IMMUNIZATION ADMIN: CPT | Performed by: NURSE PRACTITIONER

## 2021-10-28 PROCEDURE — 3074F SYST BP LT 130 MM HG: CPT | Performed by: NURSE PRACTITIONER

## 2021-10-28 PROCEDURE — 99396 PREV VISIT EST AGE 40-64: CPT | Mod: 25 | Performed by: NURSE PRACTITIONER

## 2021-10-28 PROCEDURE — 3079F DIAST BP 80-89 MM HG: CPT | Performed by: NURSE PRACTITIONER

## 2021-10-28 PROCEDURE — 90686 IIV4 VACC NO PRSV 0.5 ML IM: CPT | Performed by: NURSE PRACTITIONER

## 2021-10-28 RX ORDER — CICLOPIROX 80 MG/ML
SOLUTION TOPICAL NIGHTLY
Qty: 6.6 ML | Refills: 0 | Status: SHIPPED | OUTPATIENT
Start: 2021-10-28 | End: 2022-01-26

## 2021-10-28 RX ORDER — LEVOTHYROXINE SODIUM 150 UG/1
150 TABLET ORAL
Qty: 90 TABLET | Refills: 3 | Status: SHIPPED | OUTPATIENT
Start: 2021-10-28 | End: 2021-11-04 | Stop reason: SDUPTHER

## 2021-10-28 RX ORDER — ALPRAZOLAM 0.25 MG/1
0.25 TABLET ORAL NIGHTLY PRN
Qty: 30 TABLET | Refills: 0 | Status: SHIPPED | OUTPATIENT
Start: 2021-10-28 | End: 2021-11-27

## 2021-10-28 RX ORDER — TRAZODONE HYDROCHLORIDE 150 MG/1
150 TABLET ORAL NIGHTLY
Qty: 90 TABLET | Refills: 3 | Status: SHIPPED | OUTPATIENT
Start: 2021-10-28 | End: 2021-11-04 | Stop reason: SDUPTHER

## 2021-10-28 RX ORDER — BUDESONIDE AND FORMOTEROL FUMARATE DIHYDRATE 80; 4.5 UG/1; UG/1
2 AEROSOL RESPIRATORY (INHALATION)
Qty: 30.6 G | Refills: 0 | Status: SHIPPED | OUTPATIENT
Start: 2021-10-28 | End: 2021-11-04 | Stop reason: SDUPTHER

## 2021-10-28 RX ORDER — ALBUTEROL SULFATE 90 UG/1
2 INHALANT RESPIRATORY (INHALATION) EVERY 6 HOURS PRN
Qty: 56 G | Refills: 0 | Status: SHIPPED | OUTPATIENT
Start: 2021-10-28 | End: 2021-11-02

## 2021-10-28 RX ORDER — VENLAFAXINE HYDROCHLORIDE 150 MG/1
150 CAPSULE, EXTENDED RELEASE ORAL DAILY
Qty: 90 CAPSULE | Refills: 3 | Status: SHIPPED | OUTPATIENT
Start: 2021-10-28 | End: 2021-11-04 | Stop reason: SDUPTHER

## 2021-10-28 ASSESSMENT — ENCOUNTER SYMPTOMS
WEIGHT GAIN: 0
HAIR LOSS: 0
CONSTITUTIONAL NEGATIVE: 1
ENDOCRINE NEGATIVE: 1
DECREASED CONCENTRATION: 0
RESTLESSNESS: 0
HEMATOLOGIC/LYMPHATIC NEGATIVE: 1
WEIGHT LOSS: 0
NERVOUS/ANXIOUS: 1
MUSCULOSKELETAL NEGATIVE: 1
FATIGUE: 0
CARDIOVASCULAR NEGATIVE: 1
DEPRESSED MOOD: 0
NEUROLOGICAL NEGATIVE: 1
INSOMNIA: 1
PALPITATIONS: 0
HOARSE VOICE: 0
SHORTNESS OF BREATH: 0
GASTROINTESTINAL NEGATIVE: 1
RESPIRATORY NEGATIVE: 1
DRY SKIN: 0

## 2021-10-28 ASSESSMENT — PATIENT HEALTH QUESTIONNAIRE - PHQ9: SUM OF ALL RESPONSES TO PHQ9 QUESTIONS 1 & 2: 0

## 2021-10-28 NOTE — ASSESSMENT & PLAN NOTE
Recent TSH in [facility] that was [place]  Rx renewed for the year.  Pt needs yearly blood work to monitor.

## 2021-10-28 NOTE — ASSESSMENT & PLAN NOTE
Stable on current meds  Meds renewed today.  Will be living full time in the [facility]  1 year given.

## 2021-10-28 NOTE — PROGRESS NOTES
[facility] at [address]  [place] [address]  [phone]  Fax [phone]      Patient ID: Sherry Tang                              : 1960    Visit Date: 10/28/2021    Chief Complaint: Annual Exam         Patient ID: Sherry Tang is a 61 y.o. female.    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, insomnia type   • Colon polyp   • Routine physical examination   • Onychomycosis         Current Outpatient Medications:   •  levothyroxine (SYNTHROID) 150 mcg tablet, Take 1 tablet (150 mcg total) by mouth daily., Disp: 90 tablet, Rfl: 3  •  traZODone 150 mg tablet, Take 1 tablet (150 mg total) by mouth nightly., Disp: 90 tablet, Rfl: 3  •  venlafaxine  mg 24 hr capsule, Take 1 capsule (150 mg total) by mouth daily., Disp: 90 capsule, Rfl: 3  •  albuterol HFA 90 mcg/actuation inhaler, Inhale 2 puffs every 6 (six) hours as needed for wheezing or shortness of breath., Disp: 56 g, Rfl: 0  •  ALPRAZolam (XANAX) 0.25 mg tablet, Take 1 tablet (0.25 mg total) by mouth nightly as needed for anxiety., Disp: 30 tablet, Rfl: 0  •  budesonide-formoteroL 80-4.5 mcg/actuation inhaler, Inhale 2 puffs 2 (two) times a day. Rinse mouth with water after use to reduce aftertaste and incidence of candidiasis. Do not swallow. For patients not on a ventilator, a spacer is recommended to be used with this medication/inhaler., Disp: 30.6 g, Rfl: 0  •  ciclopirox (PENLAC) 8 % solution, Apply topically nightly. Apply over nail and surrounding skin. Apply daily over previous coat. After seven (7) days, may remove with alcohol and continue cycle., Disp: 6.6 mL, Rfl: 0    Allergies   Allergen Reactions   • Latex Rash       Social History      Tobacco Use   • Smoking status: Never Smoker   • Smokeless tobacco: Never Used   Substance Use Topics   • Alcohol use: Not on file   • Drug use: Not on file       Health Maintenance   Topic Date Due   • Cervical Cancer Screening  Never done   • Mammogram  11/16/2020   • Zoster Vaccine (1 of 2) 10/28/2022 (Originally 9/19/2010)   • HIV Screening  10/28/2022 (Originally 9/19/1973)   • Colonoscopy  11/12/2025   • DTaP, Tdap, and Td Vaccines (3 - Td or Tdap) 05/01/2027   • Influenza Vaccine  Completed   • Hepatitis C Screening  Completed   • COVID-19 Vaccine  Completed   • Meningococcal ACWY  Aged Out   • HIB Vaccines  Aged Out   • IPV Vaccines  Aged Out   • HPV Vaccines  Aged Out   • Pneumococcal  Discontinued     Past Surgical History:   Procedure Laterality Date   • CRYOTHERAPY     • ORTHOPEDIC SURGERY Right 2017    2   • TONSILLECTOMY       Family History   Problem Relation Age of Onset   • Hypertension Biological Mother    • Alzheimer's disease Biological Mother    • Alcohol abuse Biological Mother    • Esophageal cancer Biological Father    • Colon cancer Maternal Grandmother    • Heart attack Paternal Grandmother    • Heart disease Paternal Grandmother        HPI  Routine PE    Asthma  There is no hoarse voice or shortness of breath. This is a chronic problem. The current episode started more than 1 year ago. The problem occurs intermittently. Progression since onset: stable. Pertinent negatives include no chest pain or weight loss. Exacerbated by: allergens. Her symptoms are alleviated by beta-agonist and steroid inhaler. She reports complete improvement on treatment. Her past medical history is significant for asthma.   Anxiety  Presents for follow-up visit. Symptoms include excessive worry, insomnia and nervous/anxious behavior. Patient reports no chest pain, decreased concentration, depressed mood, palpitations, restlessness, shortness of breath or suicidal ideas. Symptoms occur occasionally. The severity  of symptoms is mild. The quality of sleep is good. Nighttime awakenings: occasional.     Her past medical history is significant for asthma. Compliance with medications is % (on Venlafaxine and Trazodone). Treatment side effects: none.   Thyroid Problem  Presents for follow-up (hypothyroidism on Levothyroxine.) visit. Symptoms include anxiety. Patient reports no depressed mood, dry skin, fatigue, hair loss, hoarse voice, leg swelling, palpitations, weight gain or weight loss. The symptoms have been stable.       The following have been reviewed and updated as appropriate in this visit:  Allergies  Meds  Problems         Review of System  Review of Systems   Constitutional: Negative.  Negative for fatigue, weight gain and weight loss.   HENT: Negative.  Negative for hoarse voice.    Respiratory: Negative.  Negative for shortness of breath.    Cardiovascular: Negative.  Negative for chest pain and palpitations.   Gastrointestinal: Negative.    Endocrine: Negative.    Genitourinary: Positive for dyspareunia.   Musculoskeletal: Negative.    Skin:        Fungal toenails--multiple   Allergic/Immunologic: Positive for environmental allergies. Negative for food allergies and immunocompromised state.   Neurological: Negative.    Hematological: Negative.    Psychiatric/Behavioral: Negative for decreased concentration and suicidal ideas. The patient is nervous/anxious and has insomnia.        Objective     Vitals  Vitals:    10/28/21 1538   BP: 120/80   Pulse: 67   SpO2: 98%   Weight: 69.4 kg (153 lb)     Body mass index is 21.34 kg/m².    Physical Exam  Physical Exam  Vitals reviewed.   Constitutional:       General: She is not in acute distress.     Appearance: Normal appearance. She is not diaphoretic.   HENT:      Head: Normocephalic.      Right Ear: Tympanic membrane, ear canal and external ear normal.      Left Ear: Tympanic membrane, ear canal and external ear normal.      Nose: Nose normal.      Mouth/Throat:       Mouth: Mucous membranes are moist.      Pharynx: Oropharynx is clear. No oropharyngeal exudate or posterior oropharyngeal erythema.   Eyes:      General:         Right eye: No discharge.         Left eye: No discharge.      Extraocular Movements: Extraocular movements intact.      Conjunctiva/sclera: Conjunctivae normal.      Pupils: Pupils are equal, round, and reactive to light.   Neck:      Vascular: No carotid bruit.   Cardiovascular:      Rate and Rhythm: Normal rate and regular rhythm.      Heart sounds: No murmur heard.  No friction rub. No gallop.    Pulmonary:      Effort: Pulmonary effort is normal.      Breath sounds: Normal breath sounds. No wheezing, rhonchi or rales.   Abdominal:      General: Bowel sounds are normal. There is no distension.      Palpations: Abdomen is soft. There is no mass.      Tenderness: There is no abdominal tenderness. There is no right CVA tenderness or left CVA tenderness.   Musculoskeletal:         General: No swelling or tenderness.      Cervical back: Neck supple. No rigidity or tenderness.      Right lower leg: No edema.      Left lower leg: No edema.   Lymphadenopathy:      Cervical: No cervical adenopathy.   Skin:     General: Skin is warm and dry.      Coloration: Skin is not pale.      Findings: No erythema or rash.   Neurological:      General: No focal deficit present.      Mental Status: She is alert and oriented to person, place, and time.      Gait: Gait normal.      Deep Tendon Reflexes: Reflexes normal.   Psychiatric:         Mood and Affect: Mood and affect normal.         Speech: Speech normal.         Behavior: Behavior normal.         Thought Content: Thought content does not include suicidal ideation. Thought content does not include suicidal plan.         Assessment/Plan     Problem List Items Addressed This Visit     Generalized anxiety disorder     Stable on current meds  Meds renewed today.  Will be [facility] full time in the [facility]  1 year given.          Relevant Medications    ALPRAZolam (XANAX) 0.25 mg tablet    venlafaxine  mg 24 hr capsule    traZODone 150 mg tablet    Mild intermittent asthma without complication     Symbicort and Albuterol given to use PRN  Does not need all the time.         Relevant Medications    albuterol HFA 90 mcg/actuation inhaler    budesonide-formoteroL 80-4.5 mcg/actuation inhaler    Primary hypothyroidism     Recent TSH in [facility] that was NL  Rx renewed for the year.  Pt needs yearly blood work to monitor.         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         Relevant Medications    traZODone 150 mg tablet    Routine physical examination - Primary     Flu shot given  Mammogram ordered  GYN scheduled next week.         Onychomycosis     Topical Penlac daily as directed.         Relevant Medications    ciclopirox (PENLAC) 8 % solution      Other Visit Diagnoses     Need for vaccination        Relevant Orders    Influenza vaccine quadrivalent preservative free 6 mon and older IM (FluLaval) (Completed)    Breast cancer screening by mammogram        Relevant Orders    BI SCREENING MAMMOGRAM BILATERAL(TOMOSYNTHESIS)              SHAAN Perez  10/28/2021

## 2021-11-02 ENCOUNTER — TELEPHONE (OUTPATIENT)
Dept: PRIMARY CARE | Facility: CLINIC | Age: 61
End: 2021-11-02

## 2021-11-02 DIAGNOSIS — J45.20 MILD INTERMITTENT ASTHMA WITHOUT COMPLICATION: Primary | ICD-10-CM

## 2021-11-02 RX ORDER — ALBUTEROL SULFATE 90 UG/1
2 POWDER, METERED RESPIRATORY (INHALATION) EVERY 6 HOURS PRN
Qty: 1 EACH | Refills: 1 | Status: SHIPPED | OUTPATIENT
Start: 2021-11-02 | End: 2021-11-04 | Stop reason: SDUPTHER

## 2021-11-02 NOTE — TELEPHONE ENCOUNTER
Fax rec'd from [facility] that Prior Auth is needed for:  Medication: albuterol HFA Inh (200 puffs)18GM  Sig: Inhale 2 pullfs by mouth every 6 hours as neede dfor wheezing or shortness of breath  Quantity:54  Insurance Company: [facility]     ID: [id number]  Rx BIN: [id number]  Rx PCN: [id number]  Rx Group Number:n/a   Phone Number: [phone] ([employer])

## 2021-11-04 ENCOUNTER — OFFICE VISIT (OUTPATIENT)
Dept: PRIMARY CARE | Facility: CLINIC | Age: 61
End: 2021-11-04
Payer: COMMERCIAL

## 2021-11-04 VITALS
HEART RATE: 73 BPM | HEIGHT: 72 IN | WEIGHT: 154 LBS | DIASTOLIC BLOOD PRESSURE: 80 MMHG | BODY MASS INDEX: 20.86 KG/M2 | SYSTOLIC BLOOD PRESSURE: 126 MMHG | OXYGEN SATURATION: 98 %

## 2021-11-04 DIAGNOSIS — J45.20 MILD INTERMITTENT ASTHMA WITHOUT COMPLICATION: ICD-10-CM

## 2021-11-04 DIAGNOSIS — Z01.419 ENCOUNTER FOR GYNECOLOGICAL EXAMINATION WITHOUT ABNORMAL FINDING: Primary | ICD-10-CM

## 2021-11-04 DIAGNOSIS — E03.9 PRIMARY HYPOTHYROIDISM: ICD-10-CM

## 2021-11-04 DIAGNOSIS — J45.30 MILD PERSISTENT ASTHMA WITHOUT COMPLICATION: ICD-10-CM

## 2021-11-04 DIAGNOSIS — G47.01 SLEEP DISORDER DUE TO A GENERAL MEDICAL CONDITION, INSOMNIA TYPE: ICD-10-CM

## 2021-11-04 DIAGNOSIS — F41.1 GENERALIZED ANXIETY DISORDER: ICD-10-CM

## 2021-11-04 DIAGNOSIS — N95.2 POSTMENOPAUSAL ATROPHIC VAGINITIS: ICD-10-CM

## 2021-11-04 PROCEDURE — S0612 ANNUAL GYNECOLOGICAL EXAMINA: HCPCS | Performed by: NURSE PRACTITIONER

## 2021-11-04 RX ORDER — ALBUTEROL SULFATE 90 UG/1
2 POWDER, METERED RESPIRATORY (INHALATION) EVERY 6 HOURS PRN
Qty: 1 EACH | Refills: 1 | Status: SHIPPED | OUTPATIENT
Start: 2021-11-04 | End: 2022-10-21 | Stop reason: SDUPTHER

## 2021-11-04 RX ORDER — BUDESONIDE AND FORMOTEROL FUMARATE DIHYDRATE 80; 4.5 UG/1; UG/1
2 AEROSOL RESPIRATORY (INHALATION)
Qty: 30.6 G | Refills: 0 | Status: SHIPPED | OUTPATIENT
Start: 2021-11-04 | End: 2022-10-21 | Stop reason: SDUPTHER

## 2021-11-04 RX ORDER — LEVOTHYROXINE SODIUM 150 UG/1
150 TABLET ORAL
Qty: 90 TABLET | Refills: 3 | Status: SHIPPED | OUTPATIENT
Start: 2021-11-04 | End: 2022-10-21 | Stop reason: SDUPTHER

## 2021-11-04 RX ORDER — TRAZODONE HYDROCHLORIDE 150 MG/1
150 TABLET ORAL NIGHTLY
Qty: 90 TABLET | Refills: 3 | Status: SHIPPED | OUTPATIENT
Start: 2021-11-04 | End: 2022-10-21 | Stop reason: SDUPTHER

## 2021-11-04 RX ORDER — VENLAFAXINE HYDROCHLORIDE 150 MG/1
150 CAPSULE, EXTENDED RELEASE ORAL DAILY
Qty: 90 CAPSULE | Refills: 3 | Status: SHIPPED | OUTPATIENT
Start: 2021-11-04 | End: 2022-10-21 | Stop reason: SDUPTHER

## 2021-11-04 RX ORDER — ESTRADIOL 0.1 MG/G
2 CREAM VAGINAL 2 TIMES WEEKLY
Qty: 42.5 G | Refills: 0 | Status: SHIPPED | OUTPATIENT
Start: 2021-11-04 | End: 2022-01-27 | Stop reason: SDUPTHER

## 2021-11-04 RX ORDER — ESTRADIOL 0.1 MG/G
2 CREAM VAGINAL 2 TIMES WEEKLY
Qty: 42.5 G | Refills: 0 | Status: SHIPPED | OUTPATIENT
Start: 2021-11-04 | End: 2021-11-04 | Stop reason: SDUPTHER

## 2021-11-04 ASSESSMENT — ENCOUNTER SYMPTOMS: CONSTITUTIONAL NEGATIVE: 1

## 2021-11-04 NOTE — PROGRESS NOTES
[facility]  Sarah Kerr, SHAAN  [address]  [facility] [place] [facility] [facility] [address]  [phone]  Fax [phone]      2021    Sherry Tang is a 61 y.o. female who presents for annual exam.       The patient is sexually active. GYN screening history: last pap: was normal. Domestic violence: no.     Current contraception: post menopausal status  History of abnormal Pap smear: yes - in past has had colpo many years ago with cryosurgery.  Family history of uterine or ovarian cancer: no  Regular self breast exam: yes  History of abnormal mammogram: no  Family history of breast cancer: no  History of abnormal lipids: noRoutine    Menstrual History:  OB History        0    Para   0    Term   0       0    AB   0    Living   0       SAB   0    IAB   0    Ectopic   0    Multiple   0    Live Births   0                No LMP recorded. Patient has had an ablation.         No past medical history on file.    Past Surgical History:   Procedure Laterality Date   • CRYOTHERAPY     • ORTHOPEDIC SURGERY Right 2017    2   • TONSILLECTOMY         Family History   Problem Relation Age of Onset   • Hypertension Biological Mother    • Alzheimer's disease Biological Mother    • Alcohol abuse Biological Mother    • Esophageal cancer Biological Father    • Colon cancer Maternal Grandmother    • Heart attack Paternal Grandmother    • Heart disease Paternal Grandmother        Social History     Tobacco Use   • Smoking status: Never Smoker   • Smokeless tobacco: Never Used   Substance Use Topics   • Alcohol use: Not on file   • Drug use: Not on file       The following have been reviewed and updated as appropriate in this visit:        HPI  Routine GYN exam  Post menopausal  Sexually active.  Denies any breast or pelvic symptoms.  Vaginal prolapse and atropy--would like to treat.      Review Of Systems  Review of Systems   Constitutional: Negative.    Genitourinary: Negative.    Breast:  "Negative.       Visit Vitals  /80   Pulse 73   Ht 1.816 m (5' 11.5\")   Wt 69.9 kg (154 lb)   SpO2 98%   BMI 21.18 kg/m²       OB/GYN Physical Exam  Physical Exam  Constitutional:       General: She is not in acute distress.     Appearance: Normal appearance. She is not diaphoretic.   Genitourinary:      Cervix, uterus and urethral meatus normal.      No lesions in the vagina.      Vaginal atrophy present.      No vaginal discharge, erythema or bleeding.   Rectum:      Guaiac result negative.     External female genitalia normal.   Urethral meatus normal. There is vaginal atrophy.  Cervix exam normal.  Uterus is normal.   Adnexa normal. Rectovaginal exam normal.   Rectal exam shows guaiac negative stool. Cardiovascular:      Rate and Rhythm: Normal rate and regular rhythm.   Pulmonary:      Effort: Pulmonary effort is normal. No respiratory distress.   Chest:   Breasts:      Right: Normal. No axillary adenopathy.      Left: Normal. No axillary adenopathy.       Abdominal:      General: Bowel sounds are normal. There is no distension.      Palpations: Abdomen is soft. There is no mass.      Tenderness: There is no abdominal tenderness. There is no right CVA tenderness or left CVA tenderness.   Lymphadenopathy:      Upper Body:      Right upper body: No axillary adenopathy.      Left upper body: No axillary adenopathy.   Neurological:      Mental Status: She is alert and oriented to person, place, and time.           Problem List Items Addressed This Visit     Generalized anxiety disorder    Relevant Medications    venlafaxine  mg 24 hr capsule    traZODone 150 mg tablet    Mild intermittent asthma without complication    Relevant Medications    budesonide-formoteroL 80-4.5 mcg/actuation inhaler    albuterol sulfate (PROAIR RESPICLICK) 90 mcg/actuation inhaler    Primary hypothyroidism    Relevant Medications    levothyroxine (SYNTHROID) 150 mcg tablet    Sleep disorder due to a general medical condition, " insomnia type    Relevant Medications    traZODone 150 mg tablet    Encounter for gynecological examination without abnormal finding - Primary    Current Assessment & Plan     PAP sent  Mammogram ordered.  Self breast exams monthly.         Relevant Orders    Cytology, Thinprep Pap    Postmenopausal atrophic vaginitis    Current Assessment & Plan     Topical estrogen as directed.         Relevant Medications    estradioL (ESTRACE) 0.01 % (0.1 mg/gram) vaginal cream      Other Visit Diagnoses     Mild persistent asthma without complication        Relevant Medications    budesonide-formoteroL 80-4.5 mcg/actuation inhaler    albuterol sulfate (PROAIR RESPICLICK) 90 mcg/actuation inhaler              SHAAN Perez  11/4/2021

## 2021-11-08 LAB
CYTOLOGIST CVX/VAG CYTO: NORMAL
CYTOLOGY CVX/VAG DOC CYTO: NORMAL
CYTOLOGY CVX/VAG DOC THIN PREP: NORMAL
DX ICD CODE: NORMAL
HPV I/H RISK 4 DNA CVX QL PROBE+SIG AMP: NEGATIVE
LAB CORP NOTE:: NORMAL
OTHER STN SPEC: NORMAL
STAT OF ADQ CVX/VAG CYTO-IMP: NORMAL

## 2021-11-11 ENCOUNTER — DOCUMENTATION (OUTPATIENT)
Dept: PRIMARY CARE | Facility: CLINIC | Age: 61
End: 2021-11-11

## 2021-11-11 NOTE — PROGRESS NOTES
Dominga Carmona MA has completed a chart review for Sherry Clyde and have determined that the care gap has been satisfied.    Care Gap Source: [facility]    Care Gap(s) Identified: Cervical Cancer Screening,Colorectal Cancer Screening    Chart Review Completed: Yes     PAP completed 11/4/21  Colonoscopy completed 11/12/15

## 2022-01-28 DIAGNOSIS — N95.2 POSTMENOPAUSAL ATROPHIC VAGINITIS: ICD-10-CM

## 2022-01-28 RX ORDER — ESTRADIOL 0.1 MG/G
2 CREAM VAGINAL 2 TIMES WEEKLY
Qty: 42.5 G | Refills: 2 | OUTPATIENT
Start: 2022-01-31 | End: 2022-03-02

## 2022-01-28 NOTE — TELEPHONE ENCOUNTER
Medicine Refill Request    Last Office: 11/4/2021   Last Consult Visit: Visit date not found  Last Telemedicine Visit: Visit date not found    Next Appointment: Visit date not found      Current Outpatient Medications:   •  albuterol sulfate (PROAIR RESPICLICK) 90 mcg/actuation inhaler, Inhale 2 puffs every 6 (six) hours as needed for wheezing., Disp: 1 each, Rfl: 1  •  budesonide-formoteroL 80-4.5 mcg/actuation inhaler, Inhale 2 puffs 2 (two) times a day. Rinse mouth with water after use to reduce aftertaste and incidence of candidiasis. Do not swallow. For patients not on a ventilator, a spacer is recommended to be used with this medication/inhaler., Disp: 30.6 g, Rfl: 0  •  estradioL (ESTRACE) 0.01 % (0.1 mg/gram) vaginal cream, Insert 2 g into the vagina 2 (two) times a week (Mon, Thu)., Disp: 42.5 g, Rfl: 2  •  levothyroxine (SYNTHROID) 150 mcg tablet, Take 1 tablet (150 mcg total) by mouth daily., Disp: 90 tablet, Rfl: 3  •  traZODone 150 mg tablet, Take 1 tablet (150 mg total) by mouth nightly., Disp: 90 tablet, Rfl: 3  •  venlafaxine  mg 24 hr capsule, Take 1 capsule (150 mg total) by mouth daily., Disp: 90 capsule, Rfl: 3      BP Readings from Last 3 Encounters:   11/04/21 126/80   10/28/21 120/80   08/06/19 122/80       Recent Lab results:  Lab Results   Component Value Date    CHOL 218 (H) 08/06/2019   ,   Lab Results   Component Value Date    HDL 89 08/06/2019   ,   Lab Results   Component Value Date    LDLCALC 108 (H) 08/06/2019   ,   Lab Results   Component Value Date    TRIG 107 08/06/2019        Lab Results   Component Value Date    GLUCOSE 91 08/06/2019   , No results found for: HGBA1C      Lab Results   Component Value Date    CREATININE 0.93 08/06/2019       Lab Results   Component Value Date    TSH 0.338 (L) 08/06/2019

## 2022-02-23 DIAGNOSIS — N95.2 POSTMENOPAUSAL ATROPHIC VAGINITIS: ICD-10-CM

## 2022-02-23 RX ORDER — ESTRADIOL 0.1 MG/G
2 CREAM VAGINAL 2 TIMES WEEKLY
Qty: 42.5 G | Refills: 2 | Status: SHIPPED | OUTPATIENT
Start: 2022-02-24 | End: 2022-10-21 | Stop reason: SDUPTHER

## 2022-10-21 ENCOUNTER — OFFICE VISIT (OUTPATIENT)
Dept: PRIMARY CARE | Facility: CLINIC | Age: 62
End: 2022-10-21

## 2022-10-21 VITALS
RESPIRATION RATE: 18 BRPM | OXYGEN SATURATION: 99 % | TEMPERATURE: 97.6 F | DIASTOLIC BLOOD PRESSURE: 82 MMHG | WEIGHT: 155.3 LBS | HEART RATE: 78 BPM | BODY MASS INDEX: 21.74 KG/M2 | HEIGHT: 71 IN | SYSTOLIC BLOOD PRESSURE: 138 MMHG

## 2022-10-21 DIAGNOSIS — J45.20 MILD INTERMITTENT ASTHMA WITHOUT COMPLICATION: ICD-10-CM

## 2022-10-21 DIAGNOSIS — E03.9 PRIMARY HYPOTHYROIDISM: ICD-10-CM

## 2022-10-21 DIAGNOSIS — J45.30 MILD PERSISTENT ASTHMA WITHOUT COMPLICATION: ICD-10-CM

## 2022-10-21 DIAGNOSIS — Z00.00 ROUTINE PHYSICAL EXAMINATION: ICD-10-CM

## 2022-10-21 DIAGNOSIS — F41.1 GENERALIZED ANXIETY DISORDER: ICD-10-CM

## 2022-10-21 DIAGNOSIS — G47.01 SLEEP DISORDER DUE TO A GENERAL MEDICAL CONDITION, INSOMNIA TYPE: ICD-10-CM

## 2022-10-21 DIAGNOSIS — I10 ESSENTIAL HYPERTENSION: Primary | ICD-10-CM

## 2022-10-21 DIAGNOSIS — N95.2 POSTMENOPAUSAL ATROPHIC VAGINITIS: ICD-10-CM

## 2022-10-21 PROCEDURE — 3008F BODY MASS INDEX DOCD: CPT | Performed by: NURSE PRACTITIONER

## 2022-10-21 PROCEDURE — 99396 PREV VISIT EST AGE 40-64: CPT | Performed by: NURSE PRACTITIONER

## 2022-10-21 RX ORDER — TRAZODONE HYDROCHLORIDE 150 MG/1
150 TABLET ORAL NIGHTLY
Qty: 90 TABLET | Refills: 3 | Status: SHIPPED | OUTPATIENT
Start: 2022-10-21 | End: 2024-06-11 | Stop reason: SDUPTHER

## 2022-10-21 RX ORDER — LEVOTHYROXINE SODIUM 150 UG/1
150 TABLET ORAL
Qty: 90 TABLET | Refills: 3 | Status: SHIPPED | OUTPATIENT
Start: 2022-10-21 | End: 2023-10-16

## 2022-10-21 RX ORDER — BUDESONIDE AND FORMOTEROL FUMARATE DIHYDRATE 80; 4.5 UG/1; UG/1
2 AEROSOL RESPIRATORY (INHALATION)
Qty: 30.6 G | Refills: 3 | Status: SHIPPED | OUTPATIENT
Start: 2022-10-21 | End: 2023-04-19

## 2022-10-21 RX ORDER — ESTRADIOL 0.1 MG/G
2 CREAM VAGINAL 2 TIMES WEEKLY
Qty: 42.5 G | Refills: 2 | Status: SHIPPED | OUTPATIENT
Start: 2022-10-24 | End: 2022-11-23

## 2022-10-21 RX ORDER — ALBUTEROL SULFATE 90 UG/1
2 POWDER, METERED RESPIRATORY (INHALATION) EVERY 6 HOURS PRN
Qty: 1 EACH | Refills: 2 | Status: SHIPPED | OUTPATIENT
Start: 2022-10-21 | End: 2022-11-20

## 2022-10-21 RX ORDER — VENLAFAXINE HYDROCHLORIDE 150 MG/1
150 CAPSULE, EXTENDED RELEASE ORAL DAILY
Qty: 90 CAPSULE | Refills: 3 | Status: SHIPPED | OUTPATIENT
Start: 2022-10-21 | End: 2024-06-11 | Stop reason: SDUPTHER

## 2022-10-21 ASSESSMENT — ENCOUNTER SYMPTOMS
HEMATOLOGIC/LYMPHATIC NEGATIVE: 1
RESPIRATORY NEGATIVE: 1
EYES NEGATIVE: 1
CONSTITUTIONAL NEGATIVE: 1
ENDOCRINE NEGATIVE: 1
NEUROLOGICAL NEGATIVE: 1
GASTROINTESTINAL NEGATIVE: 1
CARDIOVASCULAR NEGATIVE: 1
PSYCHIATRIC NEGATIVE: 1
MUSCULOSKELETAL NEGATIVE: 1

## 2022-10-21 ASSESSMENT — PAIN SCALES - GENERAL: PAINLEVEL: 0-NO PAIN

## 2022-10-21 ASSESSMENT — PATIENT HEALTH QUESTIONNAIRE - PHQ9: SUM OF ALL RESPONSES TO PHQ9 QUESTIONS 1 & 2: 0

## 2022-10-21 NOTE — ASSESSMENT & PLAN NOTE
Normal exam  Letter given stating good health.  Will send copies of colon screening and mammogram from [facility] [employer] through the portal.  Plans to get COVID and flu shot at her pharmacy.

## 2022-10-21 NOTE — PROGRESS NOTES
[facility] at [address]  [place] [address]  [phone]  Fax [phone]      Patient ID: Sherry Tang                              : 1960    Visit Date: 10/21/2022    Chief Complaint: Annual Exam and Med Management         Patient ID: Sherry Tang is a 62 y.o. female.    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 Rinse mouth with water after use to reduce aftertaste and incidence of candidiasis. Do not swallow. For patients not on a ventilator, a spacer is recommended to be used with this medication/inhaler., Disp: 30.6 g, Rfl: 3    [START ON 10/24/2022] estradioL (ESTRACE) 0.01 % (0.1 mg/gram) vaginal cream, Insert 2 g into the vagina 2 (two) times a week (Mon, Thu)., Disp: 42.5 g, Rfl: 2    levothyroxine (SYNTHROID) 150 mcg tablet, Take 1 tablet (150 mcg total) by mouth daily., Disp: 90 tablet, Rfl: 3    traZODone (DESYREL) 150 mg tablet, Take 1 tablet (150 mg total) by mouth nightly., Disp: 90 tablet, Rfl: 3    venlafaxine XR (EFFEXOR-XR) 150 mg 24 hr capsule, Take 1 capsule (150 mg total) by mouth daily., Disp: 90 capsule, Rfl: 3    Allergies   Allergen Reactions    Latex Rash       Social History     Tobacco Use    Smoking status: Never    Smokeless tobacco: Never       Health Maintenance   Topic  Date Due    Colorectal Cancer Screening  11/12/2020    Breast Cancer Screening  11/16/2020    COVID-19 Vaccine (3 - Booster) 08/20/2021    Influenza Vaccine (1) 08/01/2022    Zoster Vaccine (1 of 2) 10/28/2022 (Originally 9/19/2010)    HIV Screening  10/28/2022 (Originally 9/19/1973)    Cervical Cancer Screening  11/04/2026    DTaP, Tdap, and Td Vaccines (3 - Td or Tdap) 05/01/2027    Hepatitis C Screening  Completed    Meningococcal ACWY  Aged Out    HIB Vaccines  Aged Out    IPV Vaccines  Aged Out    HPV Vaccines  Aged Out    Pneumococcal  Discontinued     Past Surgical History:   Procedure Laterality Date    CRYOTHERAPY      ORTHOPEDIC SURGERY Right 2017    2    TONSILLECTOMY       Family History   Problem Relation Age of Onset    Hypertension Biological Mother     Alzheimer's disease Biological Mother     Alcohol abuse Biological Mother     Esophageal cancer Biological Father     Colon cancer Maternal Grandmother     Heart attack Paternal Grandmother     Heart disease Paternal Grandmother        HPI  Recent PE in [place] with labs  Living and working there full time  Has health care through them  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      The following have been reviewed and updated as appropriate in this visit:   Allergies  Meds  Problems         Review of System  Review of Systems   Constitutional: Negative.    HENT: Negative.    Eyes: Negative.    Respiratory: Negative.    Cardiovascular: Negative.    Gastrointestinal: Negative.    Endocrine: Negative.    Genitourinary: Negative.    Musculoskeletal: Negative.    Skin: Negative.    Allergic/Immunologic: Positive for environmental allergies. Negative for food allergies and immunocompromised state.   Neurological: Negative.    Hematological: Negative.    Psychiatric/Behavioral: Negative.        Objective     Vitals  Vitals:    10/21/22 1055   BP: 138/82   BP Location: Left upper arm   Patient Position: Sitting  "  Pulse: 78   Resp: 18   Temp: 36.4 °C (97.6 °F)   TempSrc: Temporal   SpO2: 99%   Weight: 70.4 kg (155 lb 4.8 oz)   Height: 1.816 m (5' 11.5\")     Body mass index is 21.36 kg/m².      Physical Exam  Vitals reviewed.   Constitutional:       General: She is not in acute distress.     Appearance: Normal appearance. She is not diaphoretic.   HENT:      Head: Normocephalic.      Right Ear: Tympanic membrane, ear canal and external ear normal.      Left Ear: Tympanic membrane, ear canal and external ear normal.      Nose: Nose normal.      Mouth/Throat:      Mouth: Mucous membranes are moist.      Pharynx: Oropharynx is clear. No oropharyngeal exudate or posterior oropharyngeal erythema.   Eyes:      General:         Right eye: No discharge.         Left eye: No discharge.      Extraocular Movements: Extraocular movements intact.      Conjunctiva/sclera: Conjunctivae normal.      Pupils: Pupils are equal, round, and reactive to light.   Neck:      Thyroid: No thyromegaly.   Cardiovascular:      Rate and Rhythm: Normal rate and regular rhythm.      Heart sounds: No murmur heard.    No friction rub. No gallop.   Pulmonary:      Effort: Pulmonary effort is normal.      Breath sounds: Normal breath sounds. No wheezing, rhonchi or rales.   Abdominal:      General: Bowel sounds are normal.      Palpations: Abdomen is soft.      Tenderness: There is no abdominal tenderness. There is no right CVA tenderness, left CVA tenderness or guarding.      Hernia: No hernia is present.   Musculoskeletal:      Cervical back: Neck supple. No rigidity or tenderness.      Right lower leg: No edema.      Left lower leg: No edema.   Lymphadenopathy:      Cervical: No cervical adenopathy.   Skin:     General: Skin is warm and dry.      Coloration: Skin is not pale.      Findings: No erythema or rash.   Neurological:      General: No focal deficit present.      Mental Status: She is alert and oriented to person, place, and time.      Gait: Gait " normal.      Deep Tendon Reflexes: Reflexes normal.   Psychiatric:         Mood and Affect: Mood and affect normal.         Speech: Speech normal.         Behavior: Behavior normal.         Assessment/Plan     Problem List Items Addressed This Visit     Essential hypertension - Primary     BP stable.  No meds  Continue to monitor.         Generalized anxiety disorder     Stable on meds.  Continue Effexor and Trazodone.         Relevant Medications    traZODone (DESYREL) 150 mg tablet    venlafaxine XR (EFFEXOR-XR) 150 mg 24 hr capsule    Mild intermittent asthma without complication     Stable on inhalers  Uses them PRN         Relevant Medications    albuterol sulfate (PROAIR RESPICLICK) 90 mcg/actuation inhaler    budesonide-formoteroL (SYMBICORT) 80-4.5 mcg/actuation inhaler    Primary hypothyroidism     Last TSH in [facility] was low in August.  She reduced to dose to 3 days a week of her Levothyroxine.  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         Relevant Medications    traZODone (DESYREL) 150 mg tablet    Routine physical examination     Normal exam  Letter given stating good health.  Will send copies of colon screening and mammogram from [facility] through the portal.  Plans to get COVID and flu shot at her pharmacy.         Postmenopausal atrophic vaginitis     Vaginal estrogen renewed.         Relevant Medications    estradioL (ESTRACE) 0.01 % (0.1 mg/gram) vaginal cream (Start on 10/24/2022)   Other Visit Diagnoses     Mild persistent asthma without complication        Relevant Medications    albuterol sulfate (PROAIR RESPICLICK) 90 mcg/actuation inhaler    budesonide-formoteroL (SYMBICORT) 80-4.5 mcg/actuation inhaler              SHAAN Perez  10/21/2022

## 2022-10-21 NOTE — ASSESSMENT & PLAN NOTE
Last TSH in [place] was low in August.  She reduced to dose to 3 days a week of her Levothyroxine.  Check TSH now

## 2022-10-21 NOTE — LETTER
October 21, 2022     Patient: Sherry aTng  YOB: 1960  Date of Visit: 10/21/2022    To Whom it May Concern:    Sherry Tang was seen in my clinic on 10/21/2022 at 10:45 am for a full physical exam. She was found to be in good health today with no acute concerning findings.    If you have any questions or concerns, please don't hesitate to call.         Sincerely,         SHAAN Perez        CC: No Recipients

## 2022-10-24 ENCOUNTER — TELEPHONE (OUTPATIENT)
Dept: PRIMARY CARE | Facility: CLINIC | Age: 62
End: 2022-10-24

## 2022-10-24 DIAGNOSIS — Z11.1 SCREENING-PULMONARY TB: Primary | ICD-10-CM

## 2022-10-24 NOTE — TELEPHONE ENCOUNTER
Saw letter given to pt on Friday. Pt states  needs no contagious illnesses added to letter and your written signature. Letter on you desk for review and signature.

## 2022-10-24 NOTE — TELEPHONE ENCOUNTER
Spoke with pt.  She will come next Monday, 10/31 for PPD placement.  She will return on 11/2 at 3pm to have this read.  Can you please   #1 put in the order for the PPD AND   #2 can you please update the letter she will need for residency?

## 2022-10-24 NOTE — LETTER
10/21/2022    To Whom It May Concern:         Sherry Tang was seen in my office on Friday 10/21/22 for a full physical exam. Ms Tang was found to be in good health with no acute concerning findings and no contagious illnesses.          If you would have any further questions, please contact the office at the above number.       Sincerely,             SHAAN Perez

## 2022-10-24 NOTE — TELEPHONE ENCOUNTER
Unable to reach pt via phone, sent pt portal message explaining she would need PPD prior to letter

## 2022-10-24 NOTE — TELEPHONE ENCOUNTER
"Pt called to let us know her  told her the letter for her [place] [facility] she got on Friday from [facility] needs some changes. At the end of the sentence should say \"And prevent no contagious illnesses\" Needs Sarah original signature Please advise and call pt back.  "

## 2022-10-25 LAB — TSH SERPL-ACNC: 1.3 MIU/L (ref 0.4–4.5)

## 2022-10-26 ENCOUNTER — TELEPHONE (OUTPATIENT)
Dept: PRIMARY CARE | Facility: CLINIC | Age: 62
End: 2022-10-26

## 2022-10-31 ENCOUNTER — CLINICAL SUPPORT (OUTPATIENT)
Dept: PRIMARY CARE | Facility: CLINIC | Age: 62
End: 2022-10-31

## 2022-10-31 DIAGNOSIS — Z11.1 TUBERCULOSIS SCREENING: Primary | ICD-10-CM

## 2022-10-31 PROCEDURE — 86580 TB INTRADERMAL TEST: CPT | Performed by: NURSE PRACTITIONER

## 2022-11-02 ENCOUNTER — APPOINTMENT (OUTPATIENT)
Dept: PRIMARY CARE | Facility: CLINIC | Age: 62
End: 2022-11-02

## 2022-11-02 LAB
INDURATION: 0 MM
TB SKIN TEST: NEGATIVE

## 2024-02-19 ENCOUNTER — TELEPHONE (OUTPATIENT)
Dept: PRIMARY CARE | Facility: CLINIC | Age: 64
End: 2024-02-19

## 2024-02-19 NOTE — TELEPHONE ENCOUNTER
Call & Voicemail sent to patient asking for a call back to make annual wellness appointment or call to office updating us if she in rec'ing her care w/ another practice/PCP.    VM indicates rarely checks VM --- sending my chart msg as well.    Response pending at this time.

## 2024-06-11 ENCOUNTER — OFFICE VISIT (OUTPATIENT)
Dept: PRIMARY CARE | Facility: CLINIC | Age: 64
End: 2024-06-11

## 2024-06-11 VITALS
DIASTOLIC BLOOD PRESSURE: 84 MMHG | HEIGHT: 71 IN | HEART RATE: 71 BPM | RESPIRATION RATE: 18 BRPM | SYSTOLIC BLOOD PRESSURE: 122 MMHG | BODY MASS INDEX: 21.66 KG/M2 | TEMPERATURE: 97.3 F | OXYGEN SATURATION: 95 %

## 2024-06-11 DIAGNOSIS — F41.1 GENERALIZED ANXIETY DISORDER: Primary | ICD-10-CM

## 2024-06-11 DIAGNOSIS — E03.9 PRIMARY HYPOTHYROIDISM: ICD-10-CM

## 2024-06-11 DIAGNOSIS — J45.20 MILD INTERMITTENT ASTHMA WITHOUT COMPLICATION: ICD-10-CM

## 2024-06-11 DIAGNOSIS — G47.01 SLEEP DISORDER DUE TO A GENERAL MEDICAL CONDITION, INSOMNIA TYPE: ICD-10-CM

## 2024-06-11 PROBLEM — I10 ESSENTIAL HYPERTENSION: Status: RESOLVED | Noted: 2018-11-02 | Resolved: 2024-06-11

## 2024-06-11 PROCEDURE — 3008F BODY MASS INDEX DOCD: CPT | Performed by: NURSE PRACTITIONER

## 2024-06-11 PROCEDURE — 99212 OFFICE O/P EST SF 10 MIN: CPT | Performed by: NURSE PRACTITIONER

## 2024-06-11 RX ORDER — ALPRAZOLAM 0.25 MG/1
0.25 TABLET ORAL 2 TIMES DAILY PRN
Qty: 60 TABLET | Refills: 0 | Status: SHIPPED | OUTPATIENT
Start: 2024-06-11 | End: 2024-07-11

## 2024-06-11 RX ORDER — VENLAFAXINE HYDROCHLORIDE 150 MG/1
150 CAPSULE, EXTENDED RELEASE ORAL DAILY
Qty: 90 CAPSULE | Refills: 3 | Status: SHIPPED | OUTPATIENT
Start: 2024-06-11 | End: 2025-06-06

## 2024-06-11 RX ORDER — TRAZODONE HYDROCHLORIDE 150 MG/1
150 TABLET ORAL NIGHTLY
Qty: 90 TABLET | Refills: 3 | Status: SHIPPED | OUTPATIENT
Start: 2024-06-11 | End: 2025-06-06

## 2024-06-11 ASSESSMENT — PAIN SCALES - GENERAL: PAINLEVEL: 0-NO PAIN

## 2024-06-11 ASSESSMENT — ENCOUNTER SYMPTOMS
DECREASED CONCENTRATION: 0
SHORTNESS OF BREATH: 0
NERVOUS/ANXIOUS: 1
RESTLESSNESS: 0
PALPITATIONS: 0
DEPRESSED MOOD: 0
PANIC: 0
INSOMNIA: 1

## 2024-06-11 ASSESSMENT — PATIENT HEALTH QUESTIONNAIRE - PHQ9: SUM OF ALL RESPONSES TO PHQ9 QUESTIONS 1 & 2: 0

## 2024-06-11 NOTE — ASSESSMENT & PLAN NOTE
[place].  Gets inhalers in [place].  No major flares recently  Cautious during the clifton season in

## 2024-06-11 NOTE — PROGRESS NOTES
[facility] at [address]  [facility] [address]  [phone]  Fax [phone]      Patient ID: Sherry Tang                              : 1960    Visit Date: 2024    Chief Complaint: Med Refill (Needs refills , [place] Requesting meds for I year total for mail order of xanax, trazadone and effexor ( prefers printed scripts ))         Patient ID: Sherry Tang is a 63 y.o. female.    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, insomnia type    Colon polyp    Routine physical examination    Onychomycosis    Postmenopausal atrophic vaginitis         Current Outpatient Medications:     ALPRAZolam (XANAX) 0.25 mg tablet, Take 1 tablet (0.25 mg total) by mouth 2 (two) times a day as needed for anxiety., Disp: 60 tablet, Rfl: 0    traZODone (DESYREL) 150 mg tablet, Take 1 tablet (150 mg total) by mouth nightly., Disp: 90 tablet, Rfl: 3    venlafaxine XR (EFFEXOR-XR) 150 mg 24 hr capsule, Take 1 capsule (150 mg total) by mouth daily., Disp: 90 capsule, Rfl: 3    albuterol sulfate (PROAIR RESPICLICK) 90 mcg/actuation inhaler, Inhale 2 puffs every 6 (six) hours as needed for wheezing., Disp: 1 each, Rfl: 2    budesonide-formoteroL (SYMBICORT) 80-4.5 mcg/actuation inhaler, Inhale 2 puffs 2 (two) times a day. Rinse mouth with water after use to reduce aftertaste and incidence of candidiasis. Do not swallow. For patients not on a ventilator, a spacer is recommended to be used with this medication/inhaler., Disp: 30.6 g, Rfl: 3    estradioL (ESTRACE) 0.01 % (0.1 mg/gram) vaginal cream, Insert 2 g into the vagina 2 (two) times a week (Mon, Thu)., Disp: 42.5 g, Rfl: 2    levothyroxine (SYNTHROID) 150 mcg tablet, Take 1  tablet (150 mcg total) by mouth daily., Disp: 90 tablet, Rfl: 3    Allergies   Allergen Reactions    Latex Rash       Social History     Tobacco Use    Smoking status: Never    Smokeless tobacco: Never       Health Maintenance   Topic Date Due    Depression Screening  Never done    Breast Cancer Screening  11/16/2019    Colorectal Cancer Screening  11/12/2020    Zoster Vaccine (1 of 2) 06/11/2025 (Originally 9/19/2010)    RSV (60+ years old [shared decision making] or in pregnancy during 32 through 36 weeks) (1 - 1-dose 60+ series) 06/11/2025 (Originally 9/19/2020)    COVID-19 Vaccine (3 - 2023-24 season) 06/11/2025 (Originally 9/1/2023)    Influenza Vaccine (Season Ended) 08/01/2024    Cervical Cancer Screening  11/04/2026    DTaP, Tdap, and Td Vaccines (4 - Td or Tdap) 05/01/2027    Hepatitis C Screening  Completed    Meningococcal ACWY  Aged Out    RSV <20 months  Aged Out    HIB Vaccines  Aged Out    IPV Vaccines  Aged Out    HPV Vaccines  Aged Out    Hepatitis B Vaccines  Discontinued    HIV Screening  Discontinued    Pneumococcal  Discontinued       HPI  Routine med check  Has insurance in [facility] [place].  She has [place] but [place] times per [place].  Gets yearly PE, labs and screening tests there.  Only [place] certain [place]s from [place] that [place] get [place].    Anxiety  Presents for follow-up visit. Symptoms include excessive worry, insomnia and nervous/anxious behavior. Patient reports no chest pain, decreased concentration, depressed mood, palpitations, panic, restlessness, shortness of breath or suicidal ideas. Symptoms occur occasionally. The severity of symptoms is moderate. The quality of sleep is good.     Compliance with medications is % (Effexor, Trazodone, Xanax PRN). Treatment side effects: none.       The following have been reviewed and updated as appropriate in this visit:          Review of System  Review of Systems   Respiratory:  Negative for shortness of breath.   "  Cardiovascular:  Negative for chest pain and palpitations.   Psychiatric/Behavioral:  Negative for decreased concentration and suicidal ideas. The patient is nervous/anxious and has insomnia.        Objective     Vitals  Vitals:    06/11/24 1146   BP: 122/84   BP Location: Left upper arm   Patient Position: Sitting   Pulse: 71   Resp: 18   Temp: 36.3 °C (97.3 °F)   TempSrc: Temporal   SpO2: 95%   Height: 1.803 m (5' 11\")     Body mass index is 21.66 kg/m².      Physical Exam  Vitals reviewed.   Constitutional:       General: She is not in acute distress.     Appearance: Normal appearance. She is not ill-appearing, toxic-appearing or diaphoretic.   Neck:      Thyroid: No thyromegaly.   Cardiovascular:      Rate and Rhythm: Normal rate and regular rhythm.      Heart sounds: No murmur heard.     No friction rub. No gallop.   Pulmonary:      Effort: Pulmonary effort is normal.      Breath sounds: Normal breath sounds. No wheezing, rhonchi or rales.   Musculoskeletal:      Cervical back: Neck supple. No rigidity or tenderness.   Lymphadenopathy:      Cervical: No cervical adenopathy.   Neurological:      Mental Status: She is alert and oriented to person, place, and time.   Psychiatric:         Mood and Affect: Mood and affect normal.         Speech: Speech normal.         Behavior: Behavior normal.         Thought Content: Thought content does not include suicidal ideation. Thought content does not include suicidal plan.         Assessment/Plan     Problem List Items Addressed This Visit       Generalized anxiety disorder - Primary     Continue Effexor/Trazodone  PRN Alprazolam.  Follow yearly.         Relevant Medications    traZODone (DESYREL) 150 mg tablet    venlafaxine XR (EFFEXOR-XR) 150 mg 24 hr capsule    ALPRAZolam (XANAX) 0.25 mg tablet    Mild intermittent asthma without complication     Stable.  Gets inhalers in [place].  No major flares recently  Cautious during the clif[place] season in          " Primary hypothyroidism     Gets yearly labs in [place]  Can buy brand Synthroid OTC there  Does has not changed.         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         Relevant Medications    traZODone (DESYREL) 150 mg tablet           SHAAN Perez  6/11/2024

## 2025-04-01 DIAGNOSIS — G47.01 SLEEP DISORDER DUE TO A GENERAL MEDICAL CONDITION, INSOMNIA TYPE: ICD-10-CM

## 2025-04-01 DIAGNOSIS — F41.1 GENERALIZED ANXIETY DISORDER: ICD-10-CM

## 2025-04-01 RX ORDER — VENLAFAXINE HYDROCHLORIDE 150 MG/1
150 CAPSULE, EXTENDED RELEASE ORAL DAILY
Qty: 90 CAPSULE | Refills: 0 | Status: SHIPPED | OUTPATIENT
Start: 2025-04-01 | End: 2025-06-30

## 2025-04-01 RX ORDER — TRAZODONE HYDROCHLORIDE 150 MG/1
150 TABLET ORAL NIGHTLY
Qty: 90 TABLET | Refills: 0 | Status: SHIPPED | OUTPATIENT
Start: 2025-04-01 | End: 2025-06-30

## 2025-04-01 NOTE — TELEPHONE ENCOUNTER
Called pt to schedule, [place] the [place] until SE[place].    Asked if her refills were sent to her [facility] pharmacy?  No, sent to [facility] on [address]     [facility]  #[phone] FAX #[phone]    Please advise if these medications can be sent to this pharmacy on the patients' behalf.

## 2025-06-23 DIAGNOSIS — G47.01 SLEEP DISORDER DUE TO A GENERAL MEDICAL CONDITION, INSOMNIA TYPE: ICD-10-CM

## 2025-06-23 DIAGNOSIS — F41.1 GENERALIZED ANXIETY DISORDER: ICD-10-CM

## 2025-06-24 RX ORDER — TRAZODONE HYDROCHLORIDE 150 MG/1
150 TABLET ORAL NIGHTLY
Qty: 90 TABLET | Refills: 0 | Status: SHIPPED | OUTPATIENT
Start: 2025-06-24 | End: 2025-09-22

## 2025-06-24 RX ORDER — VENLAFAXINE HYDROCHLORIDE 150 MG/1
150 CAPSULE, EXTENDED RELEASE ORAL DAILY
Qty: 90 CAPSULE | Refills: 0 | Status: SHIPPED | OUTPATIENT
Start: 2025-06-24 | End: 2025-09-22

## 2025-06-24 NOTE — TELEPHONE ENCOUNTER
"Medicine Refill Request    Last Office Visit: 6/11/2024   Last Consult Visit: Visit date not found  Last Telemedicine Visit: Visit date not found    Next Appointment: Visit date not found      Current Outpatient Medications:     albuterol sulfate (PROAIR RESPICLICK) 90 mcg/actuation inhaler, Inhale 2 puffs every 6 (six) hours as needed for wheezing., Disp: 1 each, Rfl: 2    budesonide-formoteroL (SYMBICORT) 80-4.5 mcg/actuation inhaler, Inhale 2 puffs 2 (two) times a day. Rinse mouth with water after use to reduce aftertaste and incidence of candidiasis. Do not swallow. For patients not on a ventilator, a spacer is recommended to be used with this medication/inhaler., Disp: 30.6 g, Rfl: 3    estradioL (ESTRACE) 0.01 % (0.1 mg/gram) vaginal cream, Insert 2 g into the vagina 2 (two) times a week (Mon, Thu)., Disp: 42.5 g, Rfl: 2    levothyroxine (SYNTHROID) 150 mcg tablet, Take 1 tablet (150 mcg total) by mouth daily., Disp: 90 tablet, Rfl: 3    traZODone (DESYREL) 150 mg tablet, Take 1 tablet (150 mg total) by mouth nightly., Disp: 90 tablet, Rfl: 0    venlafaxine XR (EFFEXOR-XR) 150 mg 24 hr capsule, Take 1 capsule (150 mg total) by mouth daily., Disp: 90 capsule, Rfl: 0    BP Readings from Last 3 Encounters:   06/11/24 122/84   10/21/22 138/82   11/04/21 126/80       Recent Lab results:  Lab Results   Component Value Date    CHOL 218 (H) 08/06/2019   ,   Lab Results   Component Value Date    HDL 89 08/06/2019   ,   Lab Results   Component Value Date    LDLCALC 108 (H) 08/06/2019   ,   Lab Results   Component Value Date    TRIG 107 08/06/2019        Lab Results   Component Value Date    GLUCOSE 91 08/06/2019   , No results found for: \"HGBA1C\"      Lab Results   Component Value Date    CREATININE 0.93 08/06/2019       Lab Results   Component Value Date    TSH 1.30 10/24/2022         No results found for: \"HGBA1C\"  "